# Patient Record
Sex: FEMALE | Race: WHITE | NOT HISPANIC OR LATINO | Employment: OTHER | URBAN - METROPOLITAN AREA
[De-identification: names, ages, dates, MRNs, and addresses within clinical notes are randomized per-mention and may not be internally consistent; named-entity substitution may affect disease eponyms.]

---

## 2016-08-16 LAB
EXTERNAL ABO GROUPING: NORMAL
EXTERNAL HEPATITIS B SURFACE ANTIGEN: NEGATIVE
EXTERNAL RH FACTOR: POSITIVE
EXTERNAL RUBELLA QUALITATIVE: NORMAL
EXTERNAL SYPHILIS RPR SCREEN: NORMAL
HIV1 AB SPEC QL IA.RAPID: NEGATIVE

## 2017-01-04 ENCOUNTER — ROUTINE PRENATAL (OUTPATIENT)
Dept: OBSTETRICS AND GYNECOLOGY | Facility: CLINIC | Age: 22
End: 2017-01-04

## 2017-01-04 ENCOUNTER — PROCEDURE VISIT (OUTPATIENT)
Dept: OBSTETRICS AND GYNECOLOGY | Facility: CLINIC | Age: 22
End: 2017-01-04

## 2017-01-04 VITALS — WEIGHT: 178.6 LBS | SYSTOLIC BLOOD PRESSURE: 122 MMHG | DIASTOLIC BLOOD PRESSURE: 68 MMHG

## 2017-01-04 DIAGNOSIS — Z34.03 ENCOUNTER FOR SUPERVISION OF NORMAL FIRST PREGNANCY IN THIRD TRIMESTER: Primary | ICD-10-CM

## 2017-01-04 DIAGNOSIS — R10.30 LOWER ABDOMINAL PAIN: ICD-10-CM

## 2017-01-04 LAB
BILIRUB BLD-MCNC: NEGATIVE MG/DL
GLUCOSE UR STRIP-MCNC: NEGATIVE MG/DL
KETONES UR QL: NEGATIVE
LEUKOCYTE EST, POC: NEGATIVE
NITRITE UR-MCNC: NEGATIVE MG/ML
PH UR: 6.5 [PH] (ref 5–8)
PROT UR STRIP-MCNC: NEGATIVE MG/DL
RBC # UR STRIP: ABNORMAL /UL
SP GR UR: 1.02 (ref 1–1.03)
UROBILINOGEN UR QL: NORMAL

## 2017-01-04 PROCEDURE — 99213 OFFICE O/P EST LOW 20 MIN: CPT | Performed by: OBSTETRICS & GYNECOLOGY

## 2017-01-04 PROCEDURE — 81002 URINALYSIS NONAUTO W/O SCOPE: CPT | Performed by: OBSTETRICS & GYNECOLOGY

## 2017-01-04 PROCEDURE — 76816 OB US FOLLOW-UP PER FETUS: CPT | Performed by: OBSTETRICS & GYNECOLOGY

## 2017-01-04 RX ORDER — ONDANSETRON HYDROCHLORIDE 8 MG/1
8 TABLET, FILM COATED ORAL EVERY 12 HOURS PRN
Qty: 24 TABLET | Refills: 2
Start: 2017-01-04 | End: 2017-02-21 | Stop reason: HOSPADM

## 2017-01-04 NOTE — MR AVS SNAPSHOT
Caroline Conley   1/4/2017 1:10 PM   Routine Prenatal    Dept Phone:  [phone]   Encounter #:  [id number]    Provider:  Axel Espinoza MD   Department:  [facility] OB [facility]YN                Your Full Care Plan              Today's Medication Changes          These changes are accurate as of: 1/4/17  1:57 PM.  If you have any questions, ask your nurse or doctor.               New Medication(s)Ordered:     ondansetron 8 MG tablet   Commonly known as:  ZOFRAN   Take 1 tablet by mouth Every 12 (Twelve) Hours As Needed for nausea or vomiting.   Started by:  Axel Espinoza MD            Where to Get Your Medications      Information about where to get these medications is not yet available     ! Ask your nurse or doctor about these medications     ondansetron 8 MG tablet                  Your Updated Medication List          This list is accurate as of: 1/4/17  1:57 PM.  Always use your most recent med list.                ondansetron 8 MG tablet   Commonly known as:  ZOFRAN   Take 1 tablet by mouth Every 12 (Twelve) Hours As Needed for nausea or vomiting.       * prenatal (CLASSIC) vitamin 28-0.8 MG tablet tablet       * prenatal vitamins 27-1 MG tablet tablet       promethazine 25 MG tablet   Commonly known as:  PHENERGAN       * Notice:  This list has 2 medication(s) that are the same as other medications prescribed for you. Read the directions carefully, and ask your doctor or other care provider to review them with you.            We Performed the Following     POC Urinalysis Dipstick       You Were Diagnosed With        Codes Comments    Encounter for supervision of normal first pregnancy in third trimester    -  Primary ICD-10-CM: Z34.03  ICD-9-CM: V22.0       Instructions     None    Patient Instructions History      Upcoming Appointments     Visit Type Date Time Department    OB FOLLOWUP 1/4/2017  1:10 PM [facility]    OB ULTRASOUND 1/4/2017  1:40 PM [facility]  [facility]    OB FOLLOWUP 1/18/2017 12:50 PM [facility]      MyChart Signup     Our records indicate that your [facility] [employer] account has been deactivated. If you would like to reactivate your account, please email [email or web address] or call [phone] to talk to our [employer] staff.             Other Info from Your Visit           Your Appointments     Jan 18, 2017 12:50 PM EST   OB FOLLOWUP with Axel Espinoza MD   [facility] (--)    [address] [place] [id number] [phone]              Allergies     Cefdinir  Anaphylaxis    Cephalosporins        Vital Signs     Blood Pressure Weight Last Menstrual Period Smoking Status          122/68 178 lb 9.6 oz (81 kg) 05/30/2016 Never Smoker        Problems and Diagnoses Noted     Prenatal care    -  Primary      Results     POC Urinalysis Dipstick      Component Value Standard Range & Units    Glucose, UA Negative Negative, 1000 mg/dL (3+) mg/dL    Bilirubin Negative Negative    Ketones, UA Negative Negative    Specific Gravity  1.025 1.005 - 1.030    Blood, UA Trace Negative    pH, Urine 6.5 5.0 - 8.0    Protein, POC Negative Negative mg/dL    Urobilinogen, UA Normal Normal    Leukocytes Negative Negative    Nitrite, UA Negative Negative

## 2017-01-04 NOTE — MR AVS SNAPSHOT
Caroline Conley   1/4/2017 1:40 PM   Procedure visit    Dept Phone:  [phone]   Encounter #:  [id number]    Provider:  [facility]OUND [facility]   Department:  [facility]YN                Your Full Care Plan              Today's Medication Changes          These changes are accurate as of: 1/4/17  1:56 PM.  If you have any questions, ask your nurse or doctor.               New Medication(s)Ordered:     ondansetron 8 MG tablet   Commonly known as:  ZOFRAN   Take 1 tablet by mouth Every 12 (Twelve) Hours As Needed for nausea or vomiting.   Started by:  Axel Espinoza MD            Where to Get Your Medications      Information about where to get these medications is not yet available     ! Ask your nurse or doctor about these medications     ondansetron 8 MG tablet                  Your Updated Medication List          This list is accurate as of: 1/4/17  1:56 PM.  Always use your most recent med list.                ondansetron 8 MG tablet   Commonly known as:  ZOFRAN   Take 1 tablet by mouth Every 12 (Twelve) Hours As Needed for nausea or vomiting.       * prenatal (CLASSIC) vitamin 28-0.8 MG tablet tablet       * prenatal vitamins 27-1 MG tablet tablet       promethazine 25 MG tablet   Commonly known as:  PHENERGAN       * Notice:  This list has 2 medication(s) that are the same as other medications prescribed for you. Read the directions carefully, and ask your doctor or other care provider to review them with you.            You Were Diagnosed With        Codes Comments    Large for dates    -  Primary ICD-10-CM: P08.1  ICD-9-CM: 766.1       Instructions     None    Patient Instructions History      Upcoming Appointments     Visit Type Date Time Department    OB FOLLOWUP 1/4/2017  1:10 PM [facility]    OB ULTRASOUND 1/4/2017  1:40 PM [facility]    OB FOLLOWUP 1/18/2017 12:50 PM [facility] [place] Signup     Our records indicate that  your [facility] [employer] account has been deactivated. If you would like to reactivate your account, please email [email or web address] or call [phone] to talk to our [employer] staff.             Other Info from Your Visit           Your Appointments     Jan 18, 2017 12:50 PM EST   OB FOLLOWUP with Axel Espinoza MD   [facility] (--)    [facility] [address] [address] [phone]              Allergies     Cefdinir  Anaphylaxis    Cephalosporins        Vital Signs     Last Menstrual Period Smoking Status                05/30/2016 Never Smoker          Problems and Diagnoses Noted     Large for gestational age    -  Primary

## 2017-01-04 NOTE — PROGRESS NOTES
1 hour glu wnl.  A+.  Having recent N&V.    Zoffran 8 mg, #24.  Keeping some fluids down and probably has a virus but has been instructed if not better after 12-24 hours and Zofran she needs to go to labor and delivery for IV fluids and labs to rule out such things as gallbladder disease.  Ultrasound today for size greater than dates.  Occasional cramping but cervix is long and closed.  We will hold TDAP till next visit.

## 2017-01-18 ENCOUNTER — PROCEDURE VISIT (OUTPATIENT)
Dept: OBSTETRICS AND GYNECOLOGY | Facility: CLINIC | Age: 22
End: 2017-01-18

## 2017-01-18 ENCOUNTER — ROUTINE PRENATAL (OUTPATIENT)
Dept: OBSTETRICS AND GYNECOLOGY | Facility: CLINIC | Age: 22
End: 2017-01-18

## 2017-01-18 VITALS — DIASTOLIC BLOOD PRESSURE: 72 MMHG | WEIGHT: 181.6 LBS | SYSTOLIC BLOOD PRESSURE: 126 MMHG

## 2017-01-18 DIAGNOSIS — J32.9 OTHER SINUSITIS: ICD-10-CM

## 2017-01-18 DIAGNOSIS — O36.8130 DECREASED FETAL MOVEMENT DETERMINED BY EXAMINATION, THIRD TRIMESTER, NOT APPLICABLE OR UNSPECIFIED FETUS: Primary | ICD-10-CM

## 2017-01-18 DIAGNOSIS — Z34.03 ENCOUNTER FOR SUPERVISION OF NORMAL FIRST PREGNANCY IN THIRD TRIMESTER: Primary | ICD-10-CM

## 2017-01-18 LAB
BILIRUB BLD-MCNC: NEGATIVE MG/DL
GLUCOSE UR STRIP-MCNC: NEGATIVE MG/DL
KETONES UR QL: NEGATIVE
LEUKOCYTE EST, POC: ABNORMAL
NITRITE UR-MCNC: NEGATIVE MG/ML
PH UR: 7 [PH] (ref 5–8)
PROT UR STRIP-MCNC: NEGATIVE MG/DL
RBC # UR STRIP: NEGATIVE /UL
SP GR UR: 1.03 (ref 1–1.03)
UROBILINOGEN UR QL: NORMAL

## 2017-01-18 PROCEDURE — 76819 FETAL BIOPHYS PROFIL W/O NST: CPT | Performed by: OBSTETRICS & GYNECOLOGY

## 2017-01-18 PROCEDURE — 99213 OFFICE O/P EST LOW 20 MIN: CPT | Performed by: OBSTETRICS & GYNECOLOGY

## 2017-01-18 PROCEDURE — 81002 URINALYSIS NONAUTO W/O SCOPE: CPT | Performed by: OBSTETRICS & GYNECOLOGY

## 2017-01-18 PROCEDURE — 76816 OB US FOLLOW-UP PER FETUS: CPT | Performed by: OBSTETRICS & GYNECOLOGY

## 2017-01-18 RX ORDER — AZITHROMYCIN 250 MG/1
TABLET, FILM COATED ORAL
Qty: 6 TABLET | Refills: 0
Start: 2017-01-18 | End: 2017-01-23

## 2017-01-18 NOTE — MR AVS SNAPSHOT
Caroline Conley   1/18/2017 12:50 PM   Routine Prenatal    Dept Phone:  [phone]   Encounter #:  [id number]    Provider:  Axel Espinoza MD   Department:  [facility]YN                Your Full Care Plan              Today's Medication Changes          These changes are accurate as of: 1/18/17  1:54 PM.  If you have any questions, ask your nurse or doctor.               New Medication(s)Ordered:     azithromycin 250 MG tablet   Commonly known as:  ZITHROMAX Z-CLARICE   Take 2 tablets (500 mg) on  Day 1,  followed by 1 tablet (250 mg) once daily on Days 2 through 5.   Started by:  Axel Espinoza MD            Where to Get Your Medications      Information about where to get these medications is not yet available     ! Ask your nurse or doctor about these medications     azithromycin 250 MG tablet                  Your Updated Medication List          This list is accurate as of: 1/18/17  1:54 PM.  Always use your most recent med list.                azithromycin 250 MG tablet   Commonly known as:  ZITHROMAX Z-CLARICE   Take 2 tablets (500 mg) on  Day 1,  followed by 1 tablet (250 mg) once daily on Days 2 through 5.       ondansetron 8 MG tablet   Commonly known as:  ZOFRAN   Take 1 tablet by mouth Every 12 (Twelve) Hours As Needed for nausea or vomiting.       * prenatal (CLASSIC) vitamin 28-0.8 MG tablet tablet       * prenatal vitamins 27-1 MG tablet tablet       promethazine 25 MG tablet   Commonly known as:  PHENERGAN       * Notice:  This list has 2 medication(s) that are the same as other medications prescribed for you. Read the directions carefully, and ask your doctor or other care provider to review them with you.            We Performed the Following     POC Urinalysis Dipstick       You Were Diagnosed With        Codes Comments    Encounter for supervision of normal first pregnancy in third trimester    -  Primary ICD-10-CM: Z34.03  ICD-9-CM: V22.0     Other  sinusitis     ICD-10-CM: J32.9       Instructions     None    Patient Instructions History      Upcoming Appointments     Visit Type Date Time Department    OB FOLLOWUP 1/18/2017 12:50 PM [facility]    OB ULTRASOUND 1/18/2017  1:40 PM [facility]    OB FOLLOWUP 2/1/2017  1:40 PM [facility]      MyChart Signup     Our records indicate that your [facility] [employer] account has been deactivated. If you would like to reactivate your account, please email [email or web address] or call [phone] to talk to our [employer] staff.             Other Info from Your Visit           Your Appointments     Feb 01, 2017  1:40 PM EST   OB FOLLOWUP with Axel Espinoza MD   [facility] (--)    [address] [place] [address] [phone]              Allergies     Cefdinir  Anaphylaxis    Cephalosporins        Vital Signs     Blood Pressure Weight Last Menstrual Period Smoking Status          126/72 181 lb 9.6 oz (82.4 kg) 05/30/2016 Never Smoker        Problems and Diagnoses Noted     Prenatal care    -  Primary    Other sinusitis          Results     POC Urinalysis Dipstick      Component Value Standard Range & Units    Glucose, UA Negative Negative, 1000 mg/dL (3+) mg/dL    Bilirubin Negative Negative    Ketones, UA Negative Negative    Specific Gravity  1.030 1.005 - 1.030    Blood, UA Negative Negative    pH, Urine 7.0 5.0 - 8.0    Protein, POC Negative Negative mg/dL    Urobilinogen, UA Normal Normal    Leukocytes Trace Negative    Nitrite, UA Negative Negative

## 2017-01-18 NOTE — MR AVS SNAPSHOT
Caroline Conley   1/18/2017 1:40 PM   Appointment    Dept Phone:  [phone]   Encounter #:  [id number]    Provider:  [facility]OUND [facility]   Department:  [facility]                Your Full Care Plan              Today's Medication Changes          These changes are accurate as of: 1/18/17  1:54 PM.  If you have any questions, ask your nurse or doctor.               New Medication(s)Ordered:     azithromycin 250 MG tablet   Commonly known as:  ZITHROMAX Z-CLARICE   Take 2 tablets (500 mg) on  Day 1,  followed by 1 tablet (250 mg) once daily on Days 2 through 5.   Started by:  Axel Espinoza MD            Where to Get Your Medications      Information about where to get these medications is not yet available     ! Ask your nurse or doctor about these medications     azithromycin 250 MG tablet                  Your Updated Medication List          This list is accurate as of: 1/18/17  1:54 PM.  Always use your most recent med list.                azithromycin 250 MG tablet   Commonly known as:  ZITHROMAX Z-CLARICE   Take 2 tablets (500 mg) on  Day 1,  followed by 1 tablet (250 mg) once daily on Days 2 through 5.       ondansetron 8 MG tablet   Commonly known as:  ZOFRAN   Take 1 tablet by mouth Every 12 (Twelve) Hours As Needed for nausea or vomiting.       * prenatal (CLASSIC) vitamin 28-0.8 MG tablet tablet       * prenatal vitamins 27-1 MG tablet tablet       promethazine 25 MG tablet   Commonly known as:  PHENERGAN       * Notice:  This list has 2 medication(s) that are the same as other medications prescribed for you. Read the directions carefully, and ask your doctor or other care provider to review them with you.            Instructions     None    Patient Instructions History      Upcoming Appointments     Visit Type Date Time Department    OB FOLLOWUP 1/18/2017 12:50 PM [facility]    OB ULTRASOUND 1/18/2017  1:40 PM [facility]    OB FOLLOWUP  2/1/2017  1:40 PM [facility]      [facility] Signup     Our records indicate that your [facility] [employer] account has been deactivated. If you would like to reactivate your account, please email [email or web address] or call [phone] to talk to our [employer] staff.             Other Info from Your Visit           Your Appointments     Feb 01, 2017  1:40 PM EST   OB FOLLOWUP with Axel Espinoza MD   [facility] (--)    [address] [place] [address] [phone]              Allergies     Cefdinir  Anaphylaxis    Cephalosporins        Vital Signs     Last Menstrual Period Smoking Status                05/30/2016 Never Smoker

## 2017-02-01 ENCOUNTER — ROUTINE PRENATAL (OUTPATIENT)
Dept: OBSTETRICS AND GYNECOLOGY | Facility: CLINIC | Age: 22
End: 2017-02-01

## 2017-02-01 VITALS — SYSTOLIC BLOOD PRESSURE: 116 MMHG | DIASTOLIC BLOOD PRESSURE: 66 MMHG | WEIGHT: 184 LBS

## 2017-02-01 DIAGNOSIS — Z34.03 ENCOUNTER FOR SUPERVISION OF NORMAL FIRST PREGNANCY IN THIRD TRIMESTER: Primary | ICD-10-CM

## 2017-02-01 LAB
BILIRUB BLD-MCNC: NEGATIVE MG/DL
EXTERNAL GROUP B STREP ANTIGEN: NEGATIVE
GLUCOSE UR STRIP-MCNC: NEGATIVE MG/DL
KETONES UR QL: NEGATIVE
LEUKOCYTE EST, POC: NEGATIVE
NITRITE UR-MCNC: NEGATIVE MG/ML
PROT UR STRIP-MCNC: NEGATIVE MG/DL
RBC # UR STRIP: NEGATIVE /UL

## 2017-02-01 PROCEDURE — 99212 OFFICE O/P EST SF 10 MIN: CPT | Performed by: OBSTETRICS & GYNECOLOGY

## 2017-02-01 PROCEDURE — 90715 TDAP VACCINE 7 YRS/> IM: CPT | Performed by: OBSTETRICS & GYNECOLOGY

## 2017-02-01 PROCEDURE — 81002 URINALYSIS NONAUTO W/O SCOPE: CPT | Performed by: OBSTETRICS & GYNECOLOGY

## 2017-02-01 PROCEDURE — 90471 IMMUNIZATION ADMIN: CPT | Performed by: OBSTETRICS & GYNECOLOGY

## 2017-02-01 RX ORDER — VITAMIN A ACETATE, BETA CAROTENE, ASCORBIC ACID, CHOLECALCIFEROL, .ALPHA.-TOCOPHEROL ACETATE, DL-, THIAMINE MONONITRATE, RIBOFLAVIN, NIACINAMIDE, PYRIDOXINE HYDROCHLORIDE, FOLIC ACID, CYANOCOBALAMIN, CALCIUM CARBONATE, FERROUS FUMARATE, ZINC OXIDE, CUPRIC OXIDE 3080; 12; 120; 400; 1; 1.84; 3; 20; 22; 920; 25; 200; 27; 10; 2 [IU]/1; UG/1; MG/1; [IU]/1; MG/1; MG/1; MG/1; MG/1; MG/1; [IU]/1; MG/1; MG/1; MG/1; MG/1; MG/1
1 TABLET, FILM COATED ORAL
COMMUNITY
End: 2017-02-21 | Stop reason: HOSPADM

## 2017-02-01 RX ORDER — PROMETHAZINE HYDROCHLORIDE 25 MG/1
25 TABLET ORAL EVERY 6 HOURS
COMMUNITY
End: 2017-02-21 | Stop reason: HOSPADM

## 2017-02-01 NOTE — PROGRESS NOTES
BPP last visit 8/8  VTX  4-13  47%  RAFFI=15 cm   .  CC  Prenatal f/u visit.  Had upper respiratory infection but feeling better.  Patient now having good fetal movement.  Assessment   anterior pregnancy at 35 weeks 2 days gestation doing well.  Good fetal growth.  Good fetal movement.  Plan    patient will receive TDAP.  GBS done.  Return to office in 1 week.    Patient tolerated TDAP Vaccine well.

## 2017-02-03 LAB
GP B STREP DNA SPEC QL NAA+PROBE: NEGATIVE
ONE SPECIMEN IDENTIFIER: NORMAL

## 2017-02-08 ENCOUNTER — ROUTINE PRENATAL (OUTPATIENT)
Dept: OBSTETRICS AND GYNECOLOGY | Facility: CLINIC | Age: 22
End: 2017-02-08

## 2017-02-08 VITALS — DIASTOLIC BLOOD PRESSURE: 72 MMHG | WEIGHT: 188.4 LBS | SYSTOLIC BLOOD PRESSURE: 110 MMHG

## 2017-02-08 DIAGNOSIS — Z34.03 ENCOUNTER FOR SUPERVISION OF NORMAL FIRST PREGNANCY IN THIRD TRIMESTER: Primary | ICD-10-CM

## 2017-02-08 LAB
BILIRUB BLD-MCNC: NEGATIVE MG/DL
GLUCOSE UR STRIP-MCNC: NEGATIVE MG/DL
KETONES UR QL: NEGATIVE
LEUKOCYTE EST, POC: ABNORMAL
NITRITE UR-MCNC: NEGATIVE MG/ML
PROT UR STRIP-MCNC: NEGATIVE MG/DL
RBC # UR STRIP: NEGATIVE /UL
UROBILINOGEN UR QL: NORMAL

## 2017-02-08 PROCEDURE — 99212 OFFICE O/P EST SF 10 MIN: CPT | Performed by: OBSTETRICS & GYNECOLOGY

## 2017-02-08 PROCEDURE — 81002 URINALYSIS NONAUTO W/O SCOPE: CPT | Performed by: OBSTETRICS & GYNECOLOGY

## 2017-02-15 ENCOUNTER — PROCEDURE VISIT (OUTPATIENT)
Dept: OBSTETRICS AND GYNECOLOGY | Facility: CLINIC | Age: 22
End: 2017-02-15

## 2017-02-15 ENCOUNTER — ROUTINE PRENATAL (OUTPATIENT)
Dept: OBSTETRICS AND GYNECOLOGY | Facility: CLINIC | Age: 22
End: 2017-02-15

## 2017-02-15 VITALS — WEIGHT: 188 LBS | SYSTOLIC BLOOD PRESSURE: 118 MMHG | DIASTOLIC BLOOD PRESSURE: 70 MMHG

## 2017-02-15 DIAGNOSIS — Z36.89 ENCOUNTER FOR ULTRASOUND TO CHECK FETAL GROWTH: Primary | ICD-10-CM

## 2017-02-15 DIAGNOSIS — Z34.03 ENCOUNTER FOR SUPERVISION OF NORMAL FIRST PREGNANCY IN THIRD TRIMESTER: Primary | ICD-10-CM

## 2017-02-15 LAB
BILIRUB BLD-MCNC: NEGATIVE MG/DL
GLUCOSE UR STRIP-MCNC: NEGATIVE MG/DL
KETONES UR QL: NEGATIVE
LEUKOCYTE EST, POC: ABNORMAL
NITRITE UR-MCNC: NEGATIVE MG/ML
PH UR: 7 [PH] (ref 5–8)
PROT UR STRIP-MCNC: NEGATIVE MG/DL
RBC # UR STRIP: ABNORMAL /UL
SP GR UR: 1.02 (ref 1–1.03)
UROBILINOGEN UR QL: NORMAL

## 2017-02-15 PROCEDURE — 81002 URINALYSIS NONAUTO W/O SCOPE: CPT | Performed by: NURSE PRACTITIONER

## 2017-02-15 PROCEDURE — 76805 OB US >/= 14 WKS SNGL FETUS: CPT | Performed by: NURSE PRACTITIONER

## 2017-02-15 PROCEDURE — 99213 OFFICE O/P EST LOW 20 MIN: CPT | Performed by: NURSE PRACTITIONER

## 2017-02-21 ENCOUNTER — HOSPITAL ENCOUNTER (OUTPATIENT)
Facility: HOSPITAL | Age: 22
Setting detail: OBSERVATION
Discharge: HOME | End: 2017-02-21
Attending: OBSTETRICS & GYNECOLOGY | Admitting: OBSTETRICS & GYNECOLOGY

## 2017-02-21 VITALS
DIASTOLIC BLOOD PRESSURE: 70 MMHG | HEIGHT: 62 IN | OXYGEN SATURATION: 99 % | WEIGHT: 190 LBS | TEMPERATURE: 97.9 F | RESPIRATION RATE: 18 BRPM | HEART RATE: 88 BPM | SYSTOLIC BLOOD PRESSURE: 105 MMHG | BODY MASS INDEX: 34.96 KG/M2

## 2017-02-21 PROBLEM — B00.9 HERPES: Status: ACTIVE | Noted: 2017-02-21

## 2017-02-21 PROBLEM — Z34.90 PREGNANCY: Status: ACTIVE | Noted: 2017-02-21

## 2017-02-21 PROCEDURE — G0378 HOSPITAL OBSERVATION PER HR: HCPCS

## 2017-02-21 PROCEDURE — 59025 FETAL NON-STRESS TEST: CPT

## 2017-02-21 RX ORDER — VALACYCLOVIR HYDROCHLORIDE 500 MG/1
500 TABLET, FILM COATED ORAL 2 TIMES DAILY
Qty: 6 TABLET | Refills: 0 | Status: SHIPPED | OUTPATIENT
Start: 2017-02-21 | End: 2017-02-24

## 2017-02-22 ENCOUNTER — ROUTINE PRENATAL (OUTPATIENT)
Dept: OBSTETRICS AND GYNECOLOGY | Facility: CLINIC | Age: 22
End: 2017-02-22

## 2017-02-22 VITALS — DIASTOLIC BLOOD PRESSURE: 74 MMHG | SYSTOLIC BLOOD PRESSURE: 126 MMHG | BODY MASS INDEX: 34.82 KG/M2 | WEIGHT: 190.4 LBS

## 2017-02-22 DIAGNOSIS — Z34.03 ENCOUNTER FOR SUPERVISION OF NORMAL FIRST PREGNANCY IN THIRD TRIMESTER: Primary | ICD-10-CM

## 2017-02-22 LAB
BILIRUB BLD-MCNC: NEGATIVE MG/DL
GLUCOSE UR STRIP-MCNC: NEGATIVE MG/DL
KETONES UR QL: NEGATIVE
LEUKOCYTE EST, POC: ABNORMAL
NITRITE UR-MCNC: NEGATIVE MG/ML
PH UR: 7 [PH] (ref 5–8)
PROT UR STRIP-MCNC: NEGATIVE MG/DL
RBC # UR STRIP: ABNORMAL /UL
SP GR UR: 1020 (ref 1–1.03)
UROBILINOGEN UR QL: NORMAL

## 2017-02-22 PROCEDURE — 81002 URINALYSIS NONAUTO W/O SCOPE: CPT | Performed by: OBSTETRICS & GYNECOLOGY

## 2017-02-22 PROCEDURE — 99212 OFFICE O/P EST SF 10 MIN: CPT | Performed by: OBSTETRICS & GYNECOLOGY

## 2017-02-22 NOTE — PROGRESS NOTES
CC prenatal follow up visit.  Complains of herpes outbreak.  Patient seen in labor and delivery last evening and noted to have mild outbreak of herpes and is starting Valtrex 1 g a day today.  She realizes a  is needed if she is not totally resolved by the time she goes into labor.  Her cervix was checked last evening and only 1 cm and posterior.  Impression #1 intrauterine pregnancy 38 weeks 2 days, HSV-2 outbreak  Plan return to office in 1 week and continue Valtrex 1 g a day remainder of the pregnancy.

## 2017-02-22 NOTE — NURSING NOTE
Phone report called to Dr. Chaidez. Informed of 20yo  here at 38.1 weeks with c/o constant left upper and left lower abdominal pain since around 0100. She also reports that around that time she felt a gush of clear fluid. She denies any continued leaking through the night or through the day today. nitrazine is negative, cervix is very posterior 0.5/40-50/-2. FHR is reactive and category 1 and only 1 ctx noted. Abdomen is soft and slightly tender to palpation to the LUQ and LLQ. Orders received to observe and if FHR continues to be category 1, D/C home. Next appt scheduled for tomorrow. Liliana Loo RN

## 2017-02-22 NOTE — DISCHARGE SUMMARY
Date of Discharge:  2017    Discharge Diagnosis: 1.  Intrauterine pregnancy at 38 and one sevenths weeks, undelivered.  2.  Outbreak of genital herpes    Presenting Problem/History of Present Illness  Pregnancy [Z33.1]       Hospital Course  Patient is a 21 y.o. female presented at 38 and one sevenths weeks.  She reported a gush of clear fluid after taking a bath yesterday.  No fluid since then.  She presented to rule out labor and rule out rupture of membranes.  On examination, the cervix was less than 1 cm dilated.  Heart tones were category 1 and they were very few contractions.  Nitrazine was negative.  On examination by the patient's nurse, a herpetic lesion was found.  I counseled the patient regarding this.  She reports that she has been diagnosed with herpes for several years.  She stopped taking her herpes medicine during her pregnancy.  She experiences only rare outbreaks.  We discussed the importance of herpes suppression.  We also discussed the possibility that  delivery would be required if a herpetic lesion were found at the time of labor.  The patient will treat with Valtrex 500 mg twice daily.  She will follow up with Dr. Espinoza tomorrow.  After she completes her treatment, she will begin suppressive therapy of Valtrex 1000 mg daily for the remainder of her pregnancy. .      Procedures Performed         Consults:   Consults     No orders found from 2017 to 2017.            Condition on Discharge:  Stable    Vital Signs  Temp:  [97.9 °F (36.6 °C)] 97.9 °F (36.6 °C)  Heart Rate:  [96-98] 98  Resp:  [18] 18  BP: (107-138)/(70-79) 110/71    Physical Exam:   See above    Discharge Disposition  Home or Self Care    Discharge Medications   Caroline Conley   Home Medication Instructions ARRON:[id number]    Printed on:17   Medication Information                      prenatal vitamins (PRENATAL 27-1) 27-1 MG tablet tablet  Take 1 tablet by mouth.             valACYclovir  (VALTREX) 500 MG tablet  Take 1 tablet by mouth 2 (Two) Times a Day for 3 days.                 Discharge Diet:     Activity at Discharge:     Follow-up Appointments  Future Appointments  Date Time Provider Department Center   2/22/2017 9:40 AM Axel Espinoza MD [facility] None         Test Results Pending at Discharge       Beni Chaidez MD  02/21/17  9:11 PM    Time: Discharge 10 min

## 2017-03-01 ENCOUNTER — PROCEDURE VISIT (OUTPATIENT)
Dept: OBSTETRICS AND GYNECOLOGY | Facility: CLINIC | Age: 22
End: 2017-03-01

## 2017-03-01 ENCOUNTER — ROUTINE PRENATAL (OUTPATIENT)
Dept: OBSTETRICS AND GYNECOLOGY | Facility: CLINIC | Age: 22
End: 2017-03-01

## 2017-03-01 VITALS — WEIGHT: 189.2 LBS | DIASTOLIC BLOOD PRESSURE: 74 MMHG | SYSTOLIC BLOOD PRESSURE: 128 MMHG | BODY MASS INDEX: 34.61 KG/M2

## 2017-03-01 DIAGNOSIS — O26.893 PELVIC PAIN IN PREGNANCY, ANTEPARTUM, THIRD TRIMESTER: Primary | ICD-10-CM

## 2017-03-01 DIAGNOSIS — Z34.03 ENCOUNTER FOR SUPERVISION OF NORMAL FIRST PREGNANCY IN THIRD TRIMESTER: Primary | ICD-10-CM

## 2017-03-01 DIAGNOSIS — O26.899 PELVIC PAIN AFFECTING PREGNANCY: ICD-10-CM

## 2017-03-01 DIAGNOSIS — R10.2 PELVIC PAIN IN PREGNANCY, ANTEPARTUM, THIRD TRIMESTER: Primary | ICD-10-CM

## 2017-03-01 DIAGNOSIS — R10.2 PELVIC PAIN AFFECTING PREGNANCY: ICD-10-CM

## 2017-03-01 LAB
BILIRUB BLD-MCNC: NEGATIVE MG/DL
GLUCOSE UR STRIP-MCNC: NEGATIVE MG/DL
KETONES UR QL: NEGATIVE
LEUKOCYTE EST, POC: ABNORMAL
NITRITE UR-MCNC: NEGATIVE MG/ML
PH UR: 6 [PH] (ref 5–8)
PROT UR STRIP-MCNC: NEGATIVE MG/DL
RBC # UR STRIP: ABNORMAL /UL
SP GR UR: 1.02 (ref 1–1.03)
UROBILINOGEN UR QL: NORMAL

## 2017-03-01 PROCEDURE — 81002 URINALYSIS NONAUTO W/O SCOPE: CPT | Performed by: OBSTETRICS & GYNECOLOGY

## 2017-03-01 PROCEDURE — 76816 OB US FOLLOW-UP PER FETUS: CPT | Performed by: OBSTETRICS & GYNECOLOGY

## 2017-03-01 PROCEDURE — 76819 FETAL BIOPHYS PROFIL W/O NST: CPT | Performed by: OBSTETRICS & GYNECOLOGY

## 2017-03-01 PROCEDURE — 99213 OFFICE O/P EST LOW 20 MIN: CPT | Performed by: OBSTETRICS & GYNECOLOGY

## 2017-03-01 NOTE — PROGRESS NOTES
Cc prenatal f/u visit.  Patient was seen in ER last evening with GI virus and some contractions.  She is feeling better today and her herpes outbreak has totally resolved.  She is continuing her Valtrex daily.  We'll obtain a BPP ultrasound today due to her having occasional pelvic pain.  Assessment.  39 weeks 2 days with recent HSV all break now resolved, and mild gastroenteritis with mild abdominal pain.  Plan biophysical today and return to office in 1 week with BPP then.  Consider induction at 41 weeks if undelivered as has passport  Insurance and cannot induce prior to then without problems

## 2017-03-02 ENCOUNTER — ANESTHESIA (OUTPATIENT)
Dept: LABOR AND DELIVERY | Facility: HOSPITAL | Age: 22
End: 2017-03-02

## 2017-03-02 ENCOUNTER — ANESTHESIA EVENT (OUTPATIENT)
Dept: LABOR AND DELIVERY | Facility: HOSPITAL | Age: 22
End: 2017-03-02

## 2017-03-02 ENCOUNTER — HOSPITAL ENCOUNTER (INPATIENT)
Facility: HOSPITAL | Age: 22
LOS: 4 days | Discharge: HOME | End: 2017-03-06
Attending: OBSTETRICS & GYNECOLOGY | Admitting: OBSTETRICS & GYNECOLOGY

## 2017-03-02 DIAGNOSIS — Z37.9 NORMAL LABOR: Primary | ICD-10-CM

## 2017-03-02 PROBLEM — Z34.90 PREGNANT: Status: ACTIVE | Noted: 2017-03-02

## 2017-03-02 LAB
ABO GROUP BLD: NORMAL
BASOPHILS # BLD AUTO: 0.01 10*3/MM3 (ref 0–0.2)
BASOPHILS NFR BLD AUTO: 0.1 % (ref 0–1.5)
BLD GP AB SCN SERPL QL: NEGATIVE
DEPRECATED RDW RBC AUTO: 52.4 FL (ref 37–54)
EOSINOPHIL # BLD AUTO: 0.06 10*3/MM3 (ref 0–0.7)
EOSINOPHIL NFR BLD AUTO: 0.6 % (ref 0.3–6.2)
ERYTHROCYTE [DISTWIDTH] IN BLOOD BY AUTOMATED COUNT: 15.8 % (ref 11.7–13)
HCT VFR BLD AUTO: 36.6 % (ref 35.6–45.5)
HGB BLD-MCNC: 12.2 G/DL (ref 11.9–15.5)
IMM GRANULOCYTES # BLD: 0.03 10*3/MM3 (ref 0–0.03)
IMM GRANULOCYTES NFR BLD: 0.3 % (ref 0–0.5)
LYMPHOCYTES # BLD AUTO: 2.22 10*3/MM3 (ref 0.9–4.8)
LYMPHOCYTES NFR BLD AUTO: 22.1 % (ref 19.6–45.3)
MCH RBC QN AUTO: 30.1 PG (ref 26.9–32)
MCHC RBC AUTO-ENTMCNC: 33.3 G/DL (ref 32.4–36.3)
MCV RBC AUTO: 90.4 FL (ref 80.5–98.2)
MONOCYTES # BLD AUTO: 0.88 10*3/MM3 (ref 0.2–1.2)
MONOCYTES NFR BLD AUTO: 8.7 % (ref 5–12)
NEUTROPHILS # BLD AUTO: 6.86 10*3/MM3 (ref 1.9–8.1)
NEUTROPHILS NFR BLD AUTO: 68.2 % (ref 42.7–76)
PLATELET # BLD AUTO: 265 10*3/MM3 (ref 140–500)
PMV BLD AUTO: 10.1 FL (ref 6–12)
RBC # BLD AUTO: 4.05 10*6/MM3 (ref 3.9–5.2)
RH BLD: POSITIVE
WBC NRBC COR # BLD: 10.06 10*3/MM3 (ref 4.5–10.7)

## 2017-03-02 PROCEDURE — 86901 BLOOD TYPING SEROLOGIC RH(D): CPT

## 2017-03-02 PROCEDURE — 86900 BLOOD TYPING SEROLOGIC ABO: CPT

## 2017-03-02 PROCEDURE — 25010000002 HYDROMORPHONE PER 4 MG: Performed by: ANESTHESIOLOGY

## 2017-03-02 PROCEDURE — 25010000002 MORPHINE PER 10 MG

## 2017-03-02 PROCEDURE — 59514 CESAREAN DELIVERY ONLY: CPT | Performed by: OBSTETRICS & GYNECOLOGY

## 2017-03-02 PROCEDURE — 10907ZC DRAINAGE OF AMNIOTIC FLUID, THERAPEUTIC FROM PRODUCTS OF CONCEPTION, VIA NATURAL OR ARTIFICIAL OPENING: ICD-10-PCS | Performed by: OBSTETRICS & GYNECOLOGY

## 2017-03-02 PROCEDURE — 25010000002 ONDANSETRON PER 1 MG: Performed by: ANESTHESIOLOGY

## 2017-03-02 PROCEDURE — S0260 H&P FOR SURGERY: HCPCS | Performed by: OBSTETRICS & GYNECOLOGY

## 2017-03-02 PROCEDURE — 25010000002 FENTANYL CITRATE (PF) 100 MCG/2ML SOLUTION

## 2017-03-02 PROCEDURE — C1755 CATHETER, INTRASPINAL: HCPCS

## 2017-03-02 PROCEDURE — 85025 COMPLETE CBC W/AUTO DIFF WBC: CPT | Performed by: OBSTETRICS & GYNECOLOGY

## 2017-03-02 PROCEDURE — 86850 RBC ANTIBODY SCREEN: CPT

## 2017-03-02 PROCEDURE — C1755 CATHETER, INTRASPINAL: HCPCS | Performed by: ANESTHESIOLOGY

## 2017-03-02 RX ORDER — PROMETHAZINE HYDROCHLORIDE 25 MG/ML
12.5 INJECTION, SOLUTION INTRAMUSCULAR; INTRAVENOUS EVERY 6 HOURS PRN
Status: DISCONTINUED | OUTPATIENT
Start: 2017-03-02 | End: 2017-03-02 | Stop reason: HOSPADM

## 2017-03-02 RX ORDER — CLINDAMYCIN PHOSPHATE 900 MG/50ML
900 INJECTION INTRAVENOUS EVERY 8 HOURS
Status: COMPLETED | OUTPATIENT
Start: 2017-03-03 | End: 2017-03-03

## 2017-03-02 RX ORDER — VALACYCLOVIR HYDROCHLORIDE 500 MG/1
500 TABLET, FILM COATED ORAL 2 TIMES DAILY
COMMUNITY

## 2017-03-02 RX ORDER — ONDANSETRON 4 MG/1
4 TABLET, ORALLY DISINTEGRATING ORAL EVERY 6 HOURS PRN
Status: DISCONTINUED | OUTPATIENT
Start: 2017-03-02 | End: 2017-03-02 | Stop reason: HOSPADM

## 2017-03-02 RX ORDER — SODIUM CHLORIDE, SODIUM LACTATE, POTASSIUM CHLORIDE, CALCIUM CHLORIDE 600; 310; 30; 20 MG/100ML; MG/100ML; MG/100ML; MG/100ML
125 INJECTION, SOLUTION INTRAVENOUS CONTINUOUS
Status: DISCONTINUED | OUTPATIENT
Start: 2017-03-02 | End: 2017-03-03

## 2017-03-02 RX ORDER — MORPHINE SULFATE 1 MG/ML
INJECTION, SOLUTION EPIDURAL; INTRATHECAL; INTRAVENOUS
Status: COMPLETED
Start: 2017-03-02 | End: 2017-03-02

## 2017-03-02 RX ORDER — SODIUM CHLORIDE 0.9 % (FLUSH) 0.9 %
1-10 SYRINGE (ML) INJECTION AS NEEDED
Status: DISCONTINUED | OUTPATIENT
Start: 2017-03-02 | End: 2017-03-02 | Stop reason: HOSPADM

## 2017-03-02 RX ORDER — OXYTOCIN/RINGER'S LACTATE 10/500ML
999 PLASTIC BAG, INJECTION (ML) INTRAVENOUS ONCE
Status: COMPLETED | OUTPATIENT
Start: 2017-03-02 | End: 2017-03-02

## 2017-03-02 RX ORDER — DIPHENHYDRAMINE HYDROCHLORIDE 50 MG/ML
12.5 INJECTION INTRAMUSCULAR; INTRAVENOUS EVERY 8 HOURS PRN
Status: DISCONTINUED | OUTPATIENT
Start: 2017-03-02 | End: 2017-03-02 | Stop reason: HOSPADM

## 2017-03-02 RX ORDER — METHYLERGONOVINE MALEATE 0.2 MG/ML
200 INJECTION INTRAVENOUS ONCE AS NEEDED
Status: DISCONTINUED | OUTPATIENT
Start: 2017-03-02 | End: 2017-03-02 | Stop reason: HOSPADM

## 2017-03-02 RX ORDER — PROMETHAZINE HYDROCHLORIDE 12.5 MG/1
12.5 SUPPOSITORY RECTAL EVERY 6 HOURS PRN
Status: DISCONTINUED | OUTPATIENT
Start: 2017-03-02 | End: 2017-03-02 | Stop reason: HOSPADM

## 2017-03-02 RX ORDER — ONDANSETRON 2 MG/ML
4 INJECTION INTRAMUSCULAR; INTRAVENOUS EVERY 6 HOURS PRN
Status: DISCONTINUED | OUTPATIENT
Start: 2017-03-02 | End: 2017-03-02 | Stop reason: HOSPADM

## 2017-03-02 RX ORDER — SODIUM CHLORIDE 9 MG/ML
100 INJECTION, SOLUTION INTRAVENOUS CONTINUOUS
Status: DISCONTINUED | OUTPATIENT
Start: 2017-03-02 | End: 2017-03-03

## 2017-03-02 RX ORDER — BUTORPHANOL TARTRATE 1 MG/ML
1 INJECTION, SOLUTION INTRAMUSCULAR; INTRAVENOUS
Status: DISCONTINUED | OUTPATIENT
Start: 2017-03-02 | End: 2017-03-02 | Stop reason: HOSPADM

## 2017-03-02 RX ORDER — OXYTOCIN/RINGER'S LACTATE 10/500ML
999 PLASTIC BAG, INJECTION (ML) INTRAVENOUS ONCE
Status: DISCONTINUED | OUTPATIENT
Start: 2017-03-02 | End: 2017-03-02 | Stop reason: HOSPADM

## 2017-03-02 RX ORDER — OXYTOCIN/RINGER'S LACTATE 10/500ML
125 PLASTIC BAG, INJECTION (ML) INTRAVENOUS CONTINUOUS PRN
Status: DISCONTINUED | OUTPATIENT
Start: 2017-03-02 | End: 2017-03-02 | Stop reason: HOSPADM

## 2017-03-02 RX ORDER — FAMOTIDINE 10 MG/ML
20 INJECTION, SOLUTION INTRAVENOUS ONCE AS NEEDED
Status: DISCONTINUED | OUTPATIENT
Start: 2017-03-02 | End: 2017-03-02 | Stop reason: HOSPADM

## 2017-03-02 RX ORDER — CARBOPROST TROMETHAMINE 250 UG/ML
250 INJECTION, SOLUTION INTRAMUSCULAR AS NEEDED
Status: DISCONTINUED | OUTPATIENT
Start: 2017-03-02 | End: 2017-03-02 | Stop reason: HOSPADM

## 2017-03-02 RX ORDER — FENTANYL CITRATE 50 UG/ML
INJECTION, SOLUTION INTRAMUSCULAR; INTRAVENOUS
Status: COMPLETED
Start: 2017-03-02 | End: 2017-03-02

## 2017-03-02 RX ORDER — PROMETHAZINE HYDROCHLORIDE 25 MG/1
12.5 TABLET ORAL EVERY 6 HOURS PRN
Status: DISCONTINUED | OUTPATIENT
Start: 2017-03-02 | End: 2017-03-02 | Stop reason: HOSPADM

## 2017-03-02 RX ORDER — ACETAMINOPHEN 500 MG
1000 TABLET ORAL EVERY 6 HOURS PRN
COMMUNITY
End: 2017-03-06 | Stop reason: HOSPADM

## 2017-03-02 RX ORDER — MISOPROSTOL 200 UG/1
800 TABLET ORAL AS NEEDED
Status: DISCONTINUED | OUTPATIENT
Start: 2017-03-02 | End: 2017-03-02 | Stop reason: HOSPADM

## 2017-03-02 RX ORDER — LIDOCAINE HYDROCHLORIDE 20 MG/ML
INJECTION, SOLUTION INFILTRATION; PERINEURAL AS NEEDED
Status: DISCONTINUED | OUTPATIENT
Start: 2017-03-02 | End: 2017-03-02 | Stop reason: SURG

## 2017-03-02 RX ORDER — LIDOCAINE HYDROCHLORIDE AND EPINEPHRINE 10; 10 MG/ML; UG/ML
INJECTION, SOLUTION INFILTRATION; PERINEURAL AS NEEDED
Status: DISCONTINUED | OUTPATIENT
Start: 2017-03-02 | End: 2017-03-02 | Stop reason: SURG

## 2017-03-02 RX ORDER — CLINDAMYCIN PHOSPHATE 900 MG/50ML
900 INJECTION INTRAVENOUS ONCE
Status: COMPLETED | OUTPATIENT
Start: 2017-03-02 | End: 2017-03-02

## 2017-03-02 RX ORDER — HYDROMORPHONE HYDROCHLORIDE 1 MG/ML
0.25 INJECTION, SOLUTION INTRAMUSCULAR; INTRAVENOUS; SUBCUTANEOUS
Status: DISCONTINUED | OUTPATIENT
Start: 2017-03-02 | End: 2017-03-02 | Stop reason: HOSPADM

## 2017-03-02 RX ORDER — TERBUTALINE SULFATE 1 MG/ML
0.25 INJECTION, SOLUTION SUBCUTANEOUS AS NEEDED
Status: DISCONTINUED | OUTPATIENT
Start: 2017-03-02 | End: 2017-03-02 | Stop reason: HOSPADM

## 2017-03-02 RX ORDER — ONDANSETRON 4 MG/1
4 TABLET, FILM COATED ORAL EVERY 6 HOURS PRN
Status: DISCONTINUED | OUTPATIENT
Start: 2017-03-02 | End: 2017-03-02 | Stop reason: HOSPADM

## 2017-03-02 RX ORDER — ONDANSETRON 2 MG/ML
4 INJECTION INTRAMUSCULAR; INTRAVENOUS ONCE AS NEEDED
Status: COMPLETED | OUTPATIENT
Start: 2017-03-02 | End: 2017-03-02

## 2017-03-02 RX ORDER — MIDAZOLAM HYDROCHLORIDE 1 MG/ML
INJECTION INTRAMUSCULAR; INTRAVENOUS
Status: DISCONTINUED
Start: 2017-03-02 | End: 2017-03-02 | Stop reason: WASHOUT

## 2017-03-02 RX ORDER — PROMETHAZINE HYDROCHLORIDE 25 MG/1
25 TABLET ORAL EVERY 6 HOURS PRN
COMMUNITY
End: 2017-03-06 | Stop reason: HOSPADM

## 2017-03-02 RX ADMIN — LIDOCAINE HYDROCHLORIDE 5 ML: 15 INJECTION, SOLUTION EPIDURAL; INFILTRATION; INTRACAUDAL; PERINEURAL at 20:35

## 2017-03-02 RX ADMIN — SODIUM CHLORIDE, POTASSIUM CHLORIDE, SODIUM LACTATE AND CALCIUM CHLORIDE 1000 ML: 600; 310; 30; 20 INJECTION, SOLUTION INTRAVENOUS at 08:29

## 2017-03-02 RX ADMIN — LIDOCAINE HYDROCHLORIDE 5 ML: 20 INJECTION, SOLUTION INFILTRATION; PERINEURAL at 21:12

## 2017-03-02 RX ADMIN — LIDOCAINE HYDROCHLORIDE 5 ML: 15 INJECTION, SOLUTION EPIDURAL; INFILTRATION; INTRACAUDAL; PERINEURAL at 20:44

## 2017-03-02 RX ADMIN — SODIUM CHLORIDE, POTASSIUM CHLORIDE, SODIUM LACTATE AND CALCIUM CHLORIDE 125 ML/HR: 600; 310; 30; 20 INJECTION, SOLUTION INTRAVENOUS at 13:45

## 2017-03-02 RX ADMIN — CLINDAMYCIN PHOSPHATE 900 MG: 18 INJECTION, SOLUTION INTRAMUSCULAR; INTRAVENOUS at 20:23

## 2017-03-02 RX ADMIN — HYDROMORPHONE HYDROCHLORIDE 0.25 MG: 1 INJECTION, SOLUTION INTRAMUSCULAR; INTRAVENOUS; SUBCUTANEOUS at 22:19

## 2017-03-02 RX ADMIN — EPHEDRINE SULFATE 5 MG: 50 INJECTION INTRAMUSCULAR; INTRAVENOUS; SUBCUTANEOUS at 12:43

## 2017-03-02 RX ADMIN — HYDROMORPHONE HYDROCHLORIDE 0.25 MG: 1 INJECTION, SOLUTION INTRAMUSCULAR; INTRAVENOUS; SUBCUTANEOUS at 22:35

## 2017-03-02 RX ADMIN — SODIUM CHLORIDE, POTASSIUM CHLORIDE, SODIUM LACTATE AND CALCIUM CHLORIDE: 600; 310; 30; 20 INJECTION, SOLUTION INTRAVENOUS at 21:40

## 2017-03-02 RX ADMIN — Medication 125 ML/HR: at 22:35

## 2017-03-02 RX ADMIN — Medication 10 ML/HR: at 09:36

## 2017-03-02 RX ADMIN — LIDOCAINE HYDROCHLORIDE 5 ML: 15 INJECTION, SOLUTION EPIDURAL; INFILTRATION; INTRACAUDAL; PERINEURAL at 20:18

## 2017-03-02 RX ADMIN — SODIUM CHLORIDE, POTASSIUM CHLORIDE, SODIUM LACTATE AND CALCIUM CHLORIDE 999 ML/HR: 600; 310; 30; 20 INJECTION, SOLUTION INTRAVENOUS at 09:35

## 2017-03-02 RX ADMIN — ONDANSETRON 4 MG: 2 INJECTION INTRAMUSCULAR; INTRAVENOUS at 21:54

## 2017-03-02 RX ADMIN — OXYTOCIN 999 ML/HR: 10 INJECTION, SOLUTION INTRAMUSCULAR; INTRAVENOUS at 21:08

## 2017-03-02 RX ADMIN — EPHEDRINE SULFATE 5 MG: 50 INJECTION INTRAMUSCULAR; INTRAVENOUS; SUBCUTANEOUS at 12:54

## 2017-03-02 RX ADMIN — LIDOCAINE HYDROCHLORIDE,EPINEPHRINE BITARTRATE 5 ML: 10; .01 INJECTION, SOLUTION INFILTRATION; PERINEURAL at 09:33

## 2017-03-02 RX ADMIN — EPHEDRINE SULFATE 5 MG: 50 INJECTION INTRAMUSCULAR; INTRAVENOUS; SUBCUTANEOUS at 12:34

## 2017-03-02 RX ADMIN — FENTANYL CITRATE 100 MCG: 50 INJECTION INTRAMUSCULAR; INTRAVENOUS at 21:11

## 2017-03-02 RX ADMIN — SODIUM CHLORIDE, POTASSIUM CHLORIDE, SODIUM LACTATE AND CALCIUM CHLORIDE 999 ML/HR: 600; 310; 30; 20 INJECTION, SOLUTION INTRAVENOUS at 12:33

## 2017-03-02 RX ADMIN — LIDOCAINE HYDROCHLORIDE 5 ML: 15 INJECTION, SOLUTION EPIDURAL; INFILTRATION; INTRACAUDAL; PERINEURAL at 20:51

## 2017-03-02 RX ADMIN — HYDROMORPHONE HYDROCHLORIDE 0.25 MG: 1 INJECTION, SOLUTION INTRAMUSCULAR; INTRAVENOUS; SUBCUTANEOUS at 23:28

## 2017-03-02 RX ADMIN — MORPHINE SULFATE 3 MG: 1 INJECTION EPIDURAL; INTRATHECAL; INTRAVENOUS at 21:11

## 2017-03-02 RX ADMIN — EPHEDRINE SULFATE 25 MG: 50 INJECTION INTRAMUSCULAR; INTRAVENOUS; SUBCUTANEOUS at 13:00

## 2017-03-02 NOTE — H&P
[facility]  Obstetric History and Physical    Chief Complaint   Patient presents with   • Contractions     pt rpt contractions starting earllier today andhave increased to 6-7min curently. +FM, denies LOF or VB. pt seen at [facility] at 0200 and discharged home.        Subjective     Patient is a 21 y.o. female  currently at 39w3d, who presents with contractions.    Her prenatal care is complicated by  sexually transmitted disease  genital herpes  no current active lesion or prodromal symptoms are preseent.  Her previous obstetric/gynecological history is noted for is non-contributory.    The following portions of the patients history were reviewed and updated as appropriate: current medications, allergies, past medical history, past surgical history, past family history, past social history and problem list .       Prenatal Information:  Prenatal Results         1st Trimester Ref. Range Date Time   CBC with auto diff       Rubella IgG ^ Immune   16    Hepatitis B SAg  Negative  Negative 16 1435   RPR  Non Reactive  Non Reactive 16 1435   ABO  A   17 0830   Rh  Positive   17 0830   Anibody Screen  Negative   17 0830   HIV ^ Negative   16    Varicella IgG       Urinalysis with microscopy       Urine Culture       GC/Chlamydia/TV       ThinPrep/Pap       2nd and 3rd Trimester Ref. Range Date Time   Hemoglobin / Hematocrit  36.6 % 35.6 - 45.5 % 17 0830   Hemoglobin  12.2 g/dL 11.9 - 15.5 g/dL 17 0830   Group B Strep Culture ^ Negative   17    Glucose Challege Test 1 hour       Glucose Tolerance Test 3 hours       Pre-eclampsia Panel       Risk Screening Ref. Range Date Time   Fetal Fibronectin       Amnisure       Hepatitis C Antibody       Hemoglobin electrophoresis       Cystic Fibrosis       Hemoglobin A1C       MSAFP - 4       NIPT       AFP  26.0 ng/mL ng/mL 16 1208   Parvovirus IgG       Parvovirus IgM       POCT - glucose        [facility]       24 Hour urine - Total protein       24 Hour urine - Creatinine clearance       Urinalysis with microscopy       Urine Culture       Drug Screening Ref. Range Date Time   Amphetamine Screen       Barbiturate Screen       Benzodiazepine Screen       Methadone Screen       Phencyclidine Screen       Opiates Screen       THC Screen       Cocaine Screen       Amphetamine Screen       Propoxyphene Screen       Buprenorphine Screen       Methamphetamine Screen       Oxycodone Screen       Tryicyclic Antidepressants Screen              Legend: ^: Historical            View all results for this pregnancy        External Prenatal Results         Pregnancy Outside Results - these were transcribed from office records.  See scanned records for details. Date Time   Hgb      Hct      ABO ^ A  16    Rh ^ Positive  16    Antibody Screen      Glucose Fasting GTT      Glucose Tolerance Test 1 hour      Glucose Tolerance Test 3 hour      Gonorrhea (discrete)      Chlamydia (discrete)      RPR ^ Non-Reactive  16    VDRL      Syphillis Antibody      Rubella ^ Immune  16    HBsAg ^ Negative  16    Herpes Simplex Virus PCR      Herpes Simplex VIrus Culture      HIV ^ Negative  16    Hep C RNA Quant PCR      Hep C Antibody      Urine Drug Screen      AFP      Group B Strep ^ Negative  17    GBS Susceptibility to Clindamycin      GBS Susceptibility to Eythromycin      Fetal Fibronectin      Genetic Testing, Maternal Blood             Legend: ^: Historical           Past OB History:     Obstetric History       T0      TAB0   SAB0   E0   M0   L0       # Outcome Date GA Lbr Marco Antonio/2nd Weight Sex Delivery Anes PTL Lv   1 Current                   Past Medical History: Past Medical History   Diagnosis Date   • Herpes      last outbreak , no current outbrek, FOB does not know   • Hyperemesis gravidarum    • Migraine       Past Surgical History Past Surgical History    Procedure Laterality Date   • [place] tooth extraction     • Tonsillectomy     • Adenoidectomy        Family History: History reviewed. No pertinent family history.   Social History:  reports that she has never smoked. She has never used smokeless tobacco.   reports that she drinks alcohol.   reports that she does not use illicit drugs.        Review of Systems      Objective     Vital Signs Range for the last 24 hours  Temperature: Temp:  [97.6 °F (36.4 °C)] 97.6 °F (36.4 °C)   Temp Source: Temp src: Oral   BP: BP: ()/(46-89) 102/56   Pulse: Heart Rate:  [72-94] 85   Respirations: Resp:  [20] 20   SPO2: SpO2:  [100 %] 100 %   O2 Amount (l/min):     O2 Devices O2 Device: room air   Weight: Weight:  [189 lb 3.2 oz (85.8 kg)-195 lb 3.2 oz (88.5 kg)] 195 lb 3.2 oz (88.5 kg)     Physical Examination: General appearance - alert, well appearing, and in no distress  Mental status - alert, oriented to person, place, and time  Neck - supple, no significant adenopathy  Chest - clear to auscultation, no wheezes, rales or rhonchi, symmetric air entry  Heart - normal rate, regular rhythm, normal S1, S2, no murmurs, rubs, clicks or gallops  Abdomen - soft, nontender, nondistended, no masses or organomegaly  Neurological - alert, oriented, normal speech, no focal findings or movement disorder noted  Extremities - peripheral pulses normal, no pedal edema, no clubbing or cyanosis    Presentation: vtx   Cervix: Exam by: Method: sterile exam per physician   Dilation: Dilation: 4   Effacement: Cervical Effacement: 90%   Station: Station: -2     Fetal Heart Rate Assessment   Method: Fetal HR Assessment Method: external   Beats/min: Fetal HR (Beats/Min): 130   Baseline: Fetal HR Baseline: normal range (110-160 bpm)   Varibility: Fetal HR Variability: moderate (amplitude range 6 to 25 bpm)   Accels: Fetal HR Accelerations: greater than/equal to 15 bpm, lasting at least 15 seconds   Decels: Fetal HR Decelerations: absent   Tracing  Category: Fetal HR Tracing Category: Category I     Uterine Assessment   Method: Method: per patient report   Frequency (min): Contraction Frequency (min): 2-7   Ctx Count in 10 min: Contraction Frequency in 10min: 2   Duration: Contraction Duration (sec):    Intensity: Contraction Intensity: moderate by palpation   Intensity by IUPC:     Resting Tone: Uterine Resting Tone: soft by palpation, relaxation >60 sec   Resting Tone by IUPC:     [place] Units:       Laboratory Results: reviewed  Radiology Review: EFW 7-10 yesterday  Other Studies: n/a    Assessment/Plan     Principal Problem:    Normal labor  Active Problems:    Pregnancy    Pregnant      Assessment & Plan    Assessment:  1.  Intrauterine pregnancy at 39w3d weeks gestation with reactive fetal status.    2.  labor  without ROM  3.  Obstetrical history significant for resolved HSV lesion.  4.  GBS status: No results found for: GBSANTIGEN    Plan:  1. fetal and uterine monitoring  continuously, expectant management and analgesia with  epidural  2. Plan of care has been reviewed with patient and family  3.  Risks, benefits of treatment plan have been discussed.  4.  All questions have been answered.        Panda Skelton MD  3/2/2017  10:07 AM

## 2017-03-02 NOTE — ANESTHESIA PROCEDURE NOTES
Labor Epidural    Patient location during procedure: OB  Start Time: 3/2/2017 9:25 AM  Stop Time: 3/2/2017 9:29 AM  Performed By  Anesthesiologist: BORIS JARA  Preanesthetic Checklist  Completed: patient identified, site marked, surgical consent, pre-op evaluation, timeout performed, IV checked, risks and benefits discussed and monitors and equipment checked  Additional Notes  Labor epidural using Arrow kit, no heme/CSF aspirated, no paraesthesias.  Test dose with 3cc 1.5% lido with epi is negative.    Epidural Block Prep:  Pt Position:sitting  Sterile Tech:cap, gloves, mask and sterile barrier  Monitoring:blood pressure monitoring, continuous pulse oximetry and EKG  Epidural Block Procedure:  Approach:midline  Guidance:landmark technique and palpation technique  Location:L3-L4  Needle Type:Tuohy  Needle Gauge:17  Loss of Resistance: 5cm  Cath Depth at skin:10 cm  Paresthesia: none  Aspiration:negative  Test Dose:negative  Post Assessment:  Dressing:occlusive dressing applied and secured with tape  Pt Tolerance:patient tolerated the procedure well with no apparent complications  Complications:no

## 2017-03-02 NOTE — ANESTHESIA PREPROCEDURE EVALUATION
Anesthesia Evaluation     Patient summary reviewed and Nursing notes reviewed   no history of anesthetic complications:  NPO Status: > 8 hours   Airway   Mallampati: II  TM distance: >3 FB  Neck ROM: full  no difficulty expected  Dental - normal exam     Pulmonary - negative pulmonary ROS and normal exam   Cardiovascular - negative cardio ROS and normal exam  Exercise tolerance: good (4-7 METS)    Rhythm: regular  Rate: normal        Neuro/Psych  (+) headaches,    GI/Hepatic/Renal/Endo - negative ROS     Musculoskeletal (-) negative ROS    Abdominal  - normal exam   Substance History - negative use     OB/GYN    (+) Pregnant,         Other - negative ROS                                   Anesthesia Plan    ASA 2     epidural     Anesthetic plan and risks discussed with patient.    Plan discussed with attending.

## 2017-03-03 LAB
BASOPHILS # BLD AUTO: 0.02 10*3/MM3 (ref 0–0.2)
BASOPHILS NFR BLD AUTO: 0.1 % (ref 0–1.5)
DEPRECATED RDW RBC AUTO: 53.4 FL (ref 37–54)
EOSINOPHIL # BLD AUTO: 0.02 10*3/MM3 (ref 0–0.7)
EOSINOPHIL NFR BLD AUTO: 0.1 % (ref 0.3–6.2)
ERYTHROCYTE [DISTWIDTH] IN BLOOD BY AUTOMATED COUNT: 16.2 % (ref 11.7–13)
HCT VFR BLD AUTO: 28.7 % (ref 35.6–45.5)
HGB BLD-MCNC: 9.5 G/DL (ref 11.9–15.5)
IMM GRANULOCYTES # BLD: 0.05 10*3/MM3 (ref 0–0.03)
IMM GRANULOCYTES NFR BLD: 0.3 % (ref 0–0.5)
LYMPHOCYTES # BLD AUTO: 1.39 10*3/MM3 (ref 0.9–4.8)
LYMPHOCYTES NFR BLD AUTO: 9 % (ref 19.6–45.3)
MCH RBC QN AUTO: 29.8 PG (ref 26.9–32)
MCHC RBC AUTO-ENTMCNC: 33.1 G/DL (ref 32.4–36.3)
MCV RBC AUTO: 90 FL (ref 80.5–98.2)
MONOCYTES # BLD AUTO: 1.28 10*3/MM3 (ref 0.2–1.2)
MONOCYTES NFR BLD AUTO: 8.3 % (ref 5–12)
NEUTROPHILS # BLD AUTO: 12.67 10*3/MM3 (ref 1.9–8.1)
NEUTROPHILS NFR BLD AUTO: 82.2 % (ref 42.7–76)
PLATELET # BLD AUTO: 210 10*3/MM3 (ref 140–500)
PMV BLD AUTO: 9.9 FL (ref 6–12)
RBC # BLD AUTO: 3.19 10*6/MM3 (ref 3.9–5.2)
WBC NRBC COR # BLD: 15.43 10*3/MM3 (ref 4.5–10.7)

## 2017-03-03 PROCEDURE — 85025 COMPLETE CBC W/AUTO DIFF WBC: CPT | Performed by: OBSTETRICS & GYNECOLOGY

## 2017-03-03 PROCEDURE — 63710000001 DIPHENHYDRAMINE PER 50 MG: Performed by: ANESTHESIOLOGY

## 2017-03-03 PROCEDURE — 99231 SBSQ HOSP IP/OBS SF/LOW 25: CPT | Performed by: OBSTETRICS & GYNECOLOGY

## 2017-03-03 RX ORDER — DIPHENHYDRAMINE HYDROCHLORIDE 50 MG/ML
25 INJECTION INTRAMUSCULAR; INTRAVENOUS EVERY 4 HOURS PRN
Status: DISCONTINUED | OUTPATIENT
Start: 2017-03-03 | End: 2017-03-06 | Stop reason: HOSPADM

## 2017-03-03 RX ORDER — SIMETHICONE 80 MG
80 TABLET,CHEWABLE ORAL 4 TIMES DAILY PRN
Status: DISCONTINUED | OUTPATIENT
Start: 2017-03-03 | End: 2017-03-06 | Stop reason: HOSPADM

## 2017-03-03 RX ORDER — NALOXONE HCL 0.4 MG/ML
0.2 VIAL (ML) INJECTION
Status: DISCONTINUED | OUTPATIENT
Start: 2017-03-03 | End: 2017-03-06 | Stop reason: HOSPADM

## 2017-03-03 RX ORDER — PROMETHAZINE HYDROCHLORIDE 25 MG/ML
12.5 INJECTION, SOLUTION INTRAMUSCULAR; INTRAVENOUS EVERY 6 HOURS PRN
Status: DISCONTINUED | OUTPATIENT
Start: 2017-03-03 | End: 2017-03-06 | Stop reason: HOSPADM

## 2017-03-03 RX ORDER — CALCIUM CARBONATE 200(500)MG
2 TABLET,CHEWABLE ORAL EVERY 6 HOURS PRN
Status: DISCONTINUED | OUTPATIENT
Start: 2017-03-03 | End: 2017-03-06 | Stop reason: HOSPADM

## 2017-03-03 RX ORDER — IBUPROFEN 800 MG/1
800 TABLET ORAL EVERY 8 HOURS PRN
Status: DISCONTINUED | OUTPATIENT
Start: 2017-03-03 | End: 2017-03-06 | Stop reason: HOSPADM

## 2017-03-03 RX ORDER — DOCUSATE SODIUM 100 MG/1
100 CAPSULE, LIQUID FILLED ORAL 2 TIMES DAILY PRN
Status: DISCONTINUED | OUTPATIENT
Start: 2017-03-03 | End: 2017-03-06 | Stop reason: HOSPADM

## 2017-03-03 RX ORDER — OXYTOCIN/RINGER'S LACTATE 10/500ML
125 PLASTIC BAG, INJECTION (ML) INTRAVENOUS CONTINUOUS PRN
Status: ACTIVE | OUTPATIENT
Start: 2017-03-03 | End: 2017-03-04

## 2017-03-03 RX ORDER — OXYTOCIN/RINGER'S LACTATE 10/500ML
999 PLASTIC BAG, INJECTION (ML) INTRAVENOUS ONCE
Status: DISCONTINUED | OUTPATIENT
Start: 2017-03-03 | End: 2017-03-06 | Stop reason: HOSPADM

## 2017-03-03 RX ORDER — OXYCODONE HYDROCHLORIDE AND ACETAMINOPHEN 5; 325 MG/1; MG/1
2 TABLET ORAL EVERY 4 HOURS PRN
Status: DISCONTINUED | OUTPATIENT
Start: 2017-03-03 | End: 2017-03-06 | Stop reason: HOSPADM

## 2017-03-03 RX ORDER — OXYCODONE HYDROCHLORIDE AND ACETAMINOPHEN 5; 325 MG/1; MG/1
1 TABLET ORAL EVERY 4 HOURS PRN
Status: DISCONTINUED | OUTPATIENT
Start: 2017-03-03 | End: 2017-03-06 | Stop reason: HOSPADM

## 2017-03-03 RX ORDER — BISACODYL 10 MG
10 SUPPOSITORY, RECTAL RECTAL DAILY PRN
Status: DISCONTINUED | OUTPATIENT
Start: 2017-03-03 | End: 2017-03-06 | Stop reason: HOSPADM

## 2017-03-03 RX ORDER — DIPHENHYDRAMINE HCL 25 MG
25 CAPSULE ORAL EVERY 4 HOURS PRN
Status: DISCONTINUED | OUTPATIENT
Start: 2017-03-03 | End: 2017-03-06 | Stop reason: HOSPADM

## 2017-03-03 RX ORDER — MISOPROSTOL 200 UG/1
600 TABLET ORAL ONCE
Status: DISCONTINUED | OUTPATIENT
Start: 2017-03-03 | End: 2017-03-06 | Stop reason: HOSPADM

## 2017-03-03 RX ORDER — ONDANSETRON 2 MG/ML
4 INJECTION INTRAMUSCULAR; INTRAVENOUS ONCE AS NEEDED
Status: DISCONTINUED | OUTPATIENT
Start: 2017-03-03 | End: 2017-03-06 | Stop reason: HOSPADM

## 2017-03-03 RX ORDER — PRENATAL VIT NO.126/IRON/FOLIC 28MG-0.8MG
1 TABLET ORAL DAILY
Status: DISCONTINUED | OUTPATIENT
Start: 2017-03-03 | End: 2017-03-06 | Stop reason: HOSPADM

## 2017-03-03 RX ORDER — VALACYCLOVIR HYDROCHLORIDE 500 MG/1
500 TABLET, FILM COATED ORAL 2 TIMES DAILY
Status: DISCONTINUED | OUTPATIENT
Start: 2017-03-03 | End: 2017-03-06 | Stop reason: HOSPADM

## 2017-03-03 RX ORDER — ONDANSETRON 2 MG/ML
4 INJECTION INTRAMUSCULAR; INTRAVENOUS EVERY 6 HOURS PRN
Status: DISCONTINUED | OUTPATIENT
Start: 2017-03-03 | End: 2017-03-06 | Stop reason: HOSPADM

## 2017-03-03 RX ORDER — MORPHINE SULFATE 2 MG/ML
2 INJECTION, SOLUTION INTRAMUSCULAR; INTRAVENOUS
Status: ACTIVE | OUTPATIENT
Start: 2017-03-03 | End: 2017-03-04

## 2017-03-03 RX ADMIN — VALACYCLOVIR 500 MG: 500 TABLET, FILM COATED ORAL at 20:59

## 2017-03-03 RX ADMIN — DOCUSATE SODIUM 100 MG: 100 CAPSULE, LIQUID FILLED ORAL at 09:34

## 2017-03-03 RX ADMIN — CLINDAMYCIN PHOSPHATE 900 MG: 18 INJECTION, SOLUTION INTRAMUSCULAR; INTRAVENOUS at 12:00

## 2017-03-03 RX ADMIN — OXYCODONE HYDROCHLORIDE AND ACETAMINOPHEN 1 TABLET: 5; 325 TABLET ORAL at 18:15

## 2017-03-03 RX ADMIN — SIMETHICONE CHEW TAB 80 MG 80 MG: 80 TABLET ORAL at 09:34

## 2017-03-03 RX ADMIN — CLINDAMYCIN PHOSPHATE 900 MG: 18 INJECTION, SOLUTION INTRAMUSCULAR; INTRAVENOUS at 03:48

## 2017-03-03 RX ADMIN — VALACYCLOVIR 500 MG: 500 TABLET, FILM COATED ORAL at 09:59

## 2017-03-03 RX ADMIN — SIMETHICONE CHEW TAB 80 MG 80 MG: 80 TABLET ORAL at 18:15

## 2017-03-03 RX ADMIN — OXYCODONE HYDROCHLORIDE AND ACETAMINOPHEN 1 TABLET: 5; 325 TABLET ORAL at 01:58

## 2017-03-03 RX ADMIN — IBUPROFEN 800 MG: 800 TABLET ORAL at 10:54

## 2017-03-03 RX ADMIN — OXYCODONE HYDROCHLORIDE AND ACETAMINOPHEN 1 TABLET: 5; 325 TABLET ORAL at 10:54

## 2017-03-03 RX ADMIN — IBUPROFEN 800 MG: 800 TABLET ORAL at 01:58

## 2017-03-03 RX ADMIN — Medication 1 TABLET: at 09:34

## 2017-03-03 RX ADMIN — OXYCODONE HYDROCHLORIDE AND ACETAMINOPHEN 1 TABLET: 5; 325 TABLET ORAL at 06:37

## 2017-03-03 RX ADMIN — IBUPROFEN 800 MG: 800 TABLET ORAL at 18:14

## 2017-03-03 RX ADMIN — DOCUSATE SODIUM 100 MG: 100 CAPSULE, LIQUID FILLED ORAL at 18:15

## 2017-03-03 RX ADMIN — DIPHENHYDRAMINE HYDROCHLORIDE 25 MG: 25 CAPSULE ORAL at 09:34

## 2017-03-03 NOTE — PROGRESS NOTES
[facility]   PROGRESS NOTE    Post-Op Day 1 S/P   Subjective   Subjective  Patient reports:  Pain is well controlled with prescription NSAID's including ibuprofen (Motrin) and narcotic analgesics including oxycodone/acetaminophen (Percocet, Tylox).  She is  ambulating. Tolerating diet. Tolerating po -- normal.  Intake -- c/o of tolerating po solids and tolerating po liquids.   Voiding - rubio in; no flatus reported..  Vaginal bleeding is as much as expected.    Objective    Objective     Vitals: Vital Signs Range for the last 24 hours  Temperature: Temp:  [97.1 °F (36.2 °C)-98.9 °F (37.2 °C)] 97.4 °F (36.3 °C)   Temp Source: Temp src: Oral   BP: BP: (0-134)/(0-89) 100/54   Pulse: Heart Rate:  [] 102   Respirations: Resp:  [16-18] 18   SPO2: SpO2:  [94 %-100 %] 94 %   O2 Amount (l/min): Flow (L/min):  [10] 10   O2 Devices O2 Device: room air   Weight:              Physical Exam    Lungs clear to auscultation bilaterally   Abdomen Soft, non-tender, normal bowel sounds; no bruits, organomegaly or masses.   Incision  Bandage clean, dry.   Extremities extremities normal, atraumatic, no cyanosis or edema     I reviewed the patient's new clinical results.    Assessment/Plan      Principal Problem:    Normal labor  Active Problems:    Pregnancy    Pregnant    Arrest of dilation, delivered, current hospitalization    Assessment & Plan    Assessment:    Caroline Conley is Day 1  post-partum  , Low Transverse    .      Plan:  remove dressing, remove urine catheter, advance diet  normal diet as tolerated and continue post op care.      Panda Skelton MD  17  8:15 AM

## 2017-03-03 NOTE — L&D DELIVERY NOTE
Arrest of dilation and descent  Primary LTCS  Epidural  Costa/Ray  VMI 7-11.3 8/9  No complications  EBL 800cc  Bottle feeding

## 2017-03-03 NOTE — OP NOTE
DATE OF PROCEDURE:  2017    PREOPERATIVE DIAGNOSES:  1.  Term intrauterine pregnancy.  2.  Active phase arrest of dilatation and descent.     POSTOPERATIVE DIAGNOSES:   1.  Term intrauterine pregnancy.  2.  Active phase arrest of dilatation and descent.   3.  Occiput posterior presentation.     PROCEDURE: Primary low transverse  section.     SURGEON: Panda Skelton MD     ASSISTANT: TORO Hammond      ANESTHESIA: Epidural.     COMPLICATIONS: None.     ESTIMATED BLOOD LOSS: 800 mL     DRAINS: [place] to bedside.     INDICATIONS: This is a 21-year-old white female primigravida at 39-1/2 weeks who presented with spontaneous onset of labor. She progressed along a normal labor curve to approximately 7-8 cm dilatation. At that time, she developed cervical edema and was found to have occiput posterior presentation. Attempts at position change and amnioinfusion along with adequate contractions failed to rotate the vertex or make any significant cervical change or cause any further descent of the vertex. Options were discussed with the patient and her family, and decision for  section was made.     DESCRIPTION OF PROCEDURE: With a functioning epidural, the patient was prepped and draped in the usual sterile fashion in left lateral recumbent position. Pfannenstiel incision was made and carried down through layers of the peritoneum. Bleeders were clamped and coagulated as they were encountered. The peritoneum was opened superiorly, extended the length of the incision, and the bladder blade placed. Visceral peritoneum incised and a bladder flap created with sharp dissection and bladder blade re-placed. Low-transverse uterine incision was made and extended laterally, bluntly. The infant was delivered as a vertex from the direct occiput posterior position. This was a viable male, 7 pounds, 11.3 ounces with Apgar scores of 8 at one minute and 9 at five minutes. Cord was doubly clamped and cut, and  the baby given to attending  personnel. Cord blood was then obtained. The placenta was manually removed from the fundal region of the uterus. The uterus was exteriorized and wiped clean. Uterus, tubes, and ovaries appeared grossly normal. The uterine incision was intact and repaired with a running interlocking stitch of 0 chromic. Hemostasis was achieved with interrupted figure-of-eight stitches of 0 chromic. The visceral peritoneum was reapproximated with a running stitch of 3-0 Vicryl. The uterus was placed back in the abdomen. The gutters wiped clean of blood and debris. Parietal peritoneum was then closed with a running stitch of 3-0 Vicryl. Subfascial and subcutaneous tissues were hemostatic. Fascia was closed with a running stitch of 0 Vicryl. Skin was closed with staples. The incision was bandaged and dressed. Sponge, needle and instrument counts were correct x3. The patient and  tolerated the procedure well and were taken to the recovery room in stable condition.         LONDON Martins:chidi  D:   2017 21:48:23  T:   2017 23:21:14  Job ID:   [id number]  Document ID:   [id number]  cc:

## 2017-03-03 NOTE — PROGRESS NOTES
Pt has remained 8-9cm without descent in spite of adequate contractions.  OP position. Disc options.  Rec C/S due to arrest of dilation and descent.  Pt and family agree to this mode of delivery.  Risks, complications and convalescence disc in detail.

## 2017-03-03 NOTE — PLAN OF CARE
Problem: Patient Care Overview (Adult)  Goal: Plan of Care Review  Outcome: Ongoing (interventions implemented as appropriate)    03/02/17 2318   Coping/Psychosocial Response Interventions   Plan Of Care Reviewed With patient;spouse;family   Patient Care Overview   Progress improving       Goal: Adult Individualization and Mutuality  Outcome: Ongoing (interventions implemented as appropriate)  Goal: Discharge Needs Assessment  Outcome: Ongoing (interventions implemented as appropriate)    03/02/17 2318   Discharge Needs Assessment   Concerns To Be Addressed no discharge needs identified         Problem: Labor (Cervical Ripen, Induct, Augment) (Adult,Obstetrics,Pediatric)  Goal: Signs and Symptoms of Listed Potential Problems Will be Absent or Manageable (Labor)  Outcome: Outcome(s) achieved Date Met:  03/02/17 03/02/17 2318   Labor (Cervical Ripen, Induct, Augment)   Problems Assessed (Labor) all   Problems Present (Labor) pain;fetal well-being change;malpresentation;other (see comments)  (failure to progress)         03/02/17 2318   Labor (Cervical Ripen, Induct, Augment)   Problems Assessed (Labor) all   Problems Present (Labor) pain;fetal well-being change;malpresentation;other (see comments)  (failure to progress)

## 2017-03-03 NOTE — NURSING NOTE
Arrived to [facility] via bed w/anesthesia and RN at bedside.  SCD's pump reactivated, monitors placed, hand-off report rec'd from anesthesia.

## 2017-03-04 PROCEDURE — 99232 SBSQ HOSP IP/OBS MODERATE 35: CPT | Performed by: OBSTETRICS & GYNECOLOGY

## 2017-03-04 RX ADMIN — VALACYCLOVIR 500 MG: 500 TABLET, FILM COATED ORAL at 14:44

## 2017-03-04 RX ADMIN — Medication 1 TABLET: at 09:23

## 2017-03-04 RX ADMIN — IBUPROFEN 800 MG: 800 TABLET ORAL at 18:53

## 2017-03-04 RX ADMIN — OXYCODONE HYDROCHLORIDE AND ACETAMINOPHEN 1 TABLET: 5; 325 TABLET ORAL at 18:53

## 2017-03-04 RX ADMIN — OXYCODONE HYDROCHLORIDE AND ACETAMINOPHEN 2 TABLET: 5; 325 TABLET ORAL at 02:28

## 2017-03-04 RX ADMIN — SIMETHICONE CHEW TAB 80 MG 80 MG: 80 TABLET ORAL at 09:23

## 2017-03-04 RX ADMIN — SIMETHICONE CHEW TAB 80 MG 80 MG: 80 TABLET ORAL at 17:53

## 2017-03-04 RX ADMIN — VALACYCLOVIR 500 MG: 500 TABLET, FILM COATED ORAL at 22:01

## 2017-03-04 RX ADMIN — SIMETHICONE CHEW TAB 80 MG 80 MG: 80 TABLET ORAL at 02:28

## 2017-03-04 RX ADMIN — IBUPROFEN 800 MG: 800 TABLET ORAL at 02:28

## 2017-03-04 RX ADMIN — DOCUSATE SODIUM 100 MG: 100 CAPSULE, LIQUID FILLED ORAL at 09:23

## 2017-03-04 RX ADMIN — OXYCODONE HYDROCHLORIDE AND ACETAMINOPHEN 2 TABLET: 5; 325 TABLET ORAL at 07:50

## 2017-03-04 RX ADMIN — IBUPROFEN 800 MG: 800 TABLET ORAL at 11:18

## 2017-03-04 RX ADMIN — DOCUSATE SODIUM 100 MG: 100 CAPSULE, LIQUID FILLED ORAL at 17:53

## 2017-03-04 NOTE — PROGRESS NOTES
Section Progress Note    Assessment/Plan     Status post  section: Doing well postoperatively.     1) S/P , doing well.  2) Postpartum anemia - Hg 9.5 gms - appears to be surgical loss as was 12.2 pre op.  Asymptomatic - starto po iron     Rh status: A positive  Rubella: immune  Gender: Male s/p circ    Subjective     Postpartum Day 2:  Delivery    The patient feels well. The patient denies emotional concerns. Pain is well controlled with current medications. The baby is well.Urinary output is adequate. The patient is ambulating well. The patient is tolerating a normal diet. Patient reports flatus.    Objective     Vital signs in last 24 hours:  Temp:  [97.6 °F (36.4 °C)-98.6 °F (37 °C)] 97.9 °F (36.6 °C)  Heart Rate:  [] 90  Resp:  [18-20] 18  BP: ()/(63-68) 103/65      General:    alert, appears stated age and cooperative   Bowel Sounds:  active   Lochia:  appropriate   Uterine Fundus:   firm   Incision:  healing well, no significant drainage, no dehiscence, no significant erythema   DVT Evaluation:  No evidence of DVT seen on physical exam.     Lab Results   Component Value Date    WBC 15.43 (H) 2017    HGB 9.5 (L) 2017    HCT 28.7 (L) 2017    MCV 90.0 2017     2017         Tyrone Chowdary MD  3/4/2017  11:36 AM

## 2017-03-04 NOTE — PLAN OF CARE
Problem: Patient Care Overview (Adult)  Goal: Plan of Care Review  Outcome: Ongoing (interventions implemented as appropriate)    17 0419   Coping/Psychosocial Response Interventions   Plan Of Care Reviewed With patient   Patient Care Overview   Progress improving   Outcome Evaluation   Outcome Summary/Follow up Plan ambulating and voiding x's 2, pain well controlled w percocet       Goal: Adult Individualization and Mutuality  Outcome: Ongoing (interventions implemented as appropriate)  Goal: Discharge Needs Assessment  Outcome: Ongoing (interventions implemented as appropriate)    Problem: Postpartum ( Delivery) (Adult)  Goal: Signs and Symptoms of Listed Potential Problems Will be Absent or Manageable (Postpartum)  Outcome: Ongoing (interventions implemented as appropriate)

## 2017-03-04 NOTE — PLAN OF CARE
Problem: Patient Care Overview (Adult)  Goal: Plan of Care Review  Outcome: Ongoing (interventions implemented as appropriate)  Goal: Adult Individualization and Mutuality  Outcome: Ongoing (interventions implemented as appropriate)  Plans discharge on day four, doing well on po pain meds. Incision with staples clean and dry  Goal: Discharge Needs Assessment  Outcome: Ongoing (interventions implemented as appropriate)    Problem: Postpartum ( Delivery) (Adult)  Goal: Signs and Symptoms of Listed Potential Problems Will be Absent or Manageable (Postpartum)  Outcome: Ongoing (interventions implemented as appropriate)

## 2017-03-05 PROCEDURE — 99232 SBSQ HOSP IP/OBS MODERATE 35: CPT | Performed by: OBSTETRICS & GYNECOLOGY

## 2017-03-05 RX ORDER — FERROUS SULFATE 325(65) MG
325 TABLET ORAL
Status: DISCONTINUED | OUTPATIENT
Start: 2017-03-05 | End: 2017-03-06 | Stop reason: HOSPADM

## 2017-03-05 RX ADMIN — OXYCODONE HYDROCHLORIDE AND ACETAMINOPHEN 1 TABLET: 5; 325 TABLET ORAL at 16:38

## 2017-03-05 RX ADMIN — IBUPROFEN 800 MG: 800 TABLET ORAL at 09:52

## 2017-03-05 RX ADMIN — VALACYCLOVIR 500 MG: 500 TABLET, FILM COATED ORAL at 09:52

## 2017-03-05 RX ADMIN — SIMETHICONE CHEW TAB 80 MG 80 MG: 80 TABLET ORAL at 09:52

## 2017-03-05 RX ADMIN — Medication 1 TABLET: at 09:00

## 2017-03-05 RX ADMIN — FERROUS SULFATE TAB 325 MG (65 MG ELEMENTAL FE) 325 MG: 325 (65 FE) TAB at 12:32

## 2017-03-05 RX ADMIN — VALACYCLOVIR 500 MG: 500 TABLET, FILM COATED ORAL at 21:22

## 2017-03-05 RX ADMIN — OXYCODONE HYDROCHLORIDE AND ACETAMINOPHEN 2 TABLET: 5; 325 TABLET ORAL at 21:25

## 2017-03-05 RX ADMIN — IBUPROFEN 800 MG: 800 TABLET ORAL at 21:22

## 2017-03-05 RX ADMIN — OXYCODONE HYDROCHLORIDE AND ACETAMINOPHEN 2 TABLET: 5; 325 TABLET ORAL at 07:29

## 2017-03-05 RX ADMIN — OXYCODONE HYDROCHLORIDE AND ACETAMINOPHEN 1 TABLET: 5; 325 TABLET ORAL at 01:45

## 2017-03-05 RX ADMIN — DOCUSATE SODIUM 100 MG: 100 CAPSULE, LIQUID FILLED ORAL at 09:52

## 2017-03-05 NOTE — PLAN OF CARE
Problem: Patient Care Overview (Adult)  Goal: Plan of Care Review  Outcome: Ongoing (interventions implemented as appropriate)    03/05/17 0008   Coping/Psychosocial Response Interventions   Plan Of Care Reviewed With patient;significant other   Patient Care Overview   Progress unable to show any progress toward functional goals   Outcome Evaluation   Outcome Summary/Follow up Plan up and about on own, fundus firm, incision approximated with staples in place, bottle feeding infant well

## 2017-03-05 NOTE — PLAN OF CARE
Problem: Patient Care Overview (Adult)  Goal: Plan of Care Review  Outcome: Ongoing (interventions implemented as appropriate)    17 [id number]   Coping/Psychosocial Response Interventions   Plan Of Care Reviewed With patient   Patient Care Overview   Progress improving   Outcome Evaluation   Outcome Summary/Follow up Plan up to bathroom with assistance, tolerated activity well, voided, fundus firm, infant to nursery tonight per patients request, rested       Goal: Adult Individualization and Mutuality  Outcome: Ongoing (interventions implemented as appropriate)  Goal: Discharge Needs Assessment  Outcome: Ongoing (interventions implemented as appropriate)    Problem: Postpartum ( Delivery) (Adult)  Goal: Signs and Symptoms of Listed Potential Problems Will be Absent or Manageable (Postpartum)  Outcome: Ongoing (interventions implemented as appropriate)

## 2017-03-05 NOTE — PROGRESS NOTES
CC: Patient is postoperative day number 3.  S: Patient without complaints.  No events overnight.  She is tolerating a regular diet. Passing flatus. She is ambulating and voiding without difficulty.  Patient would like to stay in the hospital tomorrow.    O:   Vitals:    03/04/17 0706 03/04/17 1512 03/04/17 2310 03/05/17 0718   BP: 103/65 103/69 103/70 111/75   BP Location: Left arm Left arm Left arm Left arm   Patient Position: Lying Lying Lying Lying   Pulse: 90 93 103 104   Resp: 18 16 16 18   Temp: 97.9 °F (36.6 °C) 98.4 °F (36.9 °C) 98.2 °F (36.8 °C) 98.4 °F (36.9 °C)   TempSrc: Oral Oral Oral Oral   SpO2:       Weight:       Height:       General: No acute distress, awake and oriented ×3  Fundus: firm/Nt/below  Abdomen: Soft, nontender, nondistended, normoactive bowel sounds  Incision: Clean, dry, and intact with staples  Extremities: No Tenderness, no Homans sign, trace lower extremity edema          Invalid input(s): POTASSIUM, LABRCNTIP, CRCL, LABRCNTIP      CrCl cannot be calculated (Patient has no serum creatinine result on file.).    Results from last 7 days  Lab Units 03/03/17  0532 03/02/17  0830   WBC 10*3/mm3 15.43* 10.06   HEMOGLOBIN g/dL 9.5* 12.2   PLATELETS 10*3/mm3 210 265               Rh+, rubella immune, male infant status post circumcision      Current Facility-Administered Medications:   •  bisacodyl (DULCOLAX) suppository 10 mg, 10 mg, Rectal, Daily PRN, Panda Skelton MD  •  calcium carbonate (TUMS) chewable tablet 500 mg (200 mg elemental), 2 tablet, Oral, Q6H PRN, Panda Skelton MD  •  diphenhydrAMINE (BENADRYL) capsule 25 mg, 25 mg, Oral, Q4H PRN, 25 mg at 03/03/17 0934 **OR** diphenhydrAMINE (BENADRYL) injection 25 mg, 25 mg, Intravenous, Q4H PRN **OR** diphenhydrAMINE (BENADRYL) injection 25 mg, 25 mg, Intramuscular, Q4H PRN, Ana Bhandari MD  •  docusate sodium (COLACE) capsule 100 mg, 100 mg, Oral, BID PRN, Panda Skelton MD, 100 mg at 03/05/17 0952  •  ibuprofen  (ADVIL,MOTRIN) tablet 800 mg, 800 mg, Oral, Q8H PRN, Padna Skelton MD, 800 mg at 17 0952  •  misoprostol (CYTOTEC) tablet 600 mcg, 600 mcg, Oral, Once, Panda Skelton MD  •  naloxone (NARCAN) injection 0.2 mg, 0.2 mg, Intravenous, Q15 Min PRN, Ana Bhandari MD  •  ondansetron (ZOFRAN) injection 4 mg, 4 mg, Intravenous, Once PRN, Ana Bhandari MD  •  ondansetron (ZOFRAN) injection 4 mg, 4 mg, Intravenous, Q6H PRN, Panda Skelton MD  •  oxyCODONE-acetaminophen (PERCOCET) 5-325 MG per tablet 1 tablet, 1 tablet, Oral, Q4H PRN, Panda Skelton MD, 1 tablet at 17 0145  •  oxyCODONE-acetaminophen (PERCOCET) 5-325 MG per tablet 2 tablet, 2 tablet, Oral, Q4H PRN, Panda Skelton MD, 2 tablet at 17 0729  •  oxytocin (PITOCIN) 10 units in lactated Ringer's 500 mL IVPB solution, 999 mL/hr, Intravenous, Once **FOLLOWED BY** [] oxytocin (PITOCIN) 10 units in lactated Ringer's 500 mL IVPB solution, 125 mL/hr, Intravenous, Continuous PRN, Panda Skelton MD  •  prenatal (CLASSIC) vitamin tablet 1 tablet, 1 tablet, Oral, Daily, Panda Skelton MD, 1 tablet at 17 0923  •  promethazine (PHENERGAN) injection 12.5 mg, 12.5 mg, Intravenous, Q6H PRN **OR** promethazine (PHENERGAN) injection 12.5 mg, 12.5 mg, Intramuscular, Q6H PRN, Ana Bhandari MD  •  simethicone (MYLICON) chewable tablet 80 mg, 80 mg, Oral, 4x Daily PRN, Panda Skelton MD, 80 mg at 17 0952  •  valACYclovir (VALTREX) tablet 500 mg, 500 mg, Oral, BID, Panda Skelton MD, 500 mg at 17 0952    Assessment:  1.  21-year-old  1 para 1 status post primary low transverse  delivery, postoperative day #3  2.  Asymptomatic anemia, stable    Plan:  1.  Routine postoperative day 3 care.  Continue oral iron.  Likely discharge home tomorrow.  Remove staples tomorrow prior to discharge.  2. I spent 25 minutes on the floor in the care of this patient (reviewing the chart, consult  other physicians, direct patient care), with greater than 50 percent this time in direct counseling/coordination with the patient and her family.

## 2017-03-06 VITALS
HEIGHT: 62 IN | OXYGEN SATURATION: 100 % | HEART RATE: 86 BPM | TEMPERATURE: 98.4 F | DIASTOLIC BLOOD PRESSURE: 72 MMHG | SYSTOLIC BLOOD PRESSURE: 112 MMHG | BODY MASS INDEX: 35.92 KG/M2 | RESPIRATION RATE: 16 BRPM | WEIGHT: 195.2 LBS

## 2017-03-06 PROBLEM — Z37.9 NORMAL LABOR: Status: RESOLVED | Noted: 2017-03-02 | Resolved: 2017-03-06

## 2017-03-06 PROBLEM — Z34.90 PREGNANCY: Status: RESOLVED | Noted: 2017-02-21 | Resolved: 2017-03-06

## 2017-03-06 PROBLEM — Z34.90 PREGNANT: Status: RESOLVED | Noted: 2017-03-02 | Resolved: 2017-03-06

## 2017-03-06 PROCEDURE — 99238 HOSP IP/OBS DSCHRG MGMT 30/<: CPT | Performed by: OBSTETRICS & GYNECOLOGY

## 2017-03-06 RX ORDER — OXYCODONE HYDROCHLORIDE AND ACETAMINOPHEN 5; 325 MG/1; MG/1
1 TABLET ORAL EVERY 6 HOURS PRN
Qty: 30 TABLET | Refills: 0 | Status: SHIPPED | OUTPATIENT
Start: 2017-03-06 | End: 2017-03-13

## 2017-03-06 RX ORDER — IBUPROFEN 800 MG/1
800 TABLET ORAL EVERY 8 HOURS PRN
Qty: 30 TABLET | Refills: 0 | Status: SHIPPED | OUTPATIENT
Start: 2017-03-06 | End: 2018-04-04

## 2017-03-06 RX ORDER — PSEUDOEPHEDRINE HCL 30 MG
100 TABLET ORAL 2 TIMES DAILY PRN
Qty: 20 CAPSULE | Refills: 0 | Status: SHIPPED | OUTPATIENT
Start: 2017-03-06 | End: 2018-04-04

## 2017-03-06 RX ADMIN — VALACYCLOVIR 500 MG: 500 TABLET, FILM COATED ORAL at 09:12

## 2017-03-06 RX ADMIN — FERROUS SULFATE TAB 325 MG (65 MG ELEMENTAL FE) 325 MG: 325 (65 FE) TAB at 09:09

## 2017-03-06 RX ADMIN — IBUPROFEN 800 MG: 800 TABLET ORAL at 05:28

## 2017-03-06 RX ADMIN — SIMETHICONE CHEW TAB 80 MG 80 MG: 80 TABLET ORAL at 09:12

## 2017-03-06 RX ADMIN — OXYCODONE HYDROCHLORIDE AND ACETAMINOPHEN 1 TABLET: 5; 325 TABLET ORAL at 02:18

## 2017-03-06 RX ADMIN — DOCUSATE SODIUM 100 MG: 100 CAPSULE, LIQUID FILLED ORAL at 09:10

## 2017-03-06 RX ADMIN — OXYCODONE HYDROCHLORIDE AND ACETAMINOPHEN 2 TABLET: 5; 325 TABLET ORAL at 06:24

## 2017-03-06 RX ADMIN — OXYCODONE HYDROCHLORIDE AND ACETAMINOPHEN 2 TABLET: 5; 325 TABLET ORAL at 10:56

## 2017-03-06 NOTE — DISCHARGE SUMMARY
Date of Discharge:  3/6/2017    Discharge Diagnosis: Term pregnancy, delivered.  Arrest of descent    Presenting Problem/History of Present Illness  Pregnant [Z33.1]  Pregnancy [Z33.1]       Hospital Course  Patient is a 21 y.o. female presented with active labor at term.  She expressed an arrest of descent and was taken for  delivery.  A vigorous  was delivered.  The postoperative course was smooth.  By postoperative day #4, the patient was afebrile, tolerating a regular diet and her incision was healing well.  At this point the patient requested discharge and I felt that she was stable for this.  Her staples will be removed and Steri-Strips placed prior to discharge.      Procedures Performed  Procedure(s):   SECTION PRIMARY       Consults:   Consults     No orders found from 2017 to 3/3/2017.            Condition on Discharge:  Stable    Vital Signs  Temp:  [98.1 °F (36.7 °C)-98.5 °F (36.9 °C)] 98.4 °F (36.9 °C)  Heart Rate:  [78-89] 86  Resp:  [16-18] 16  BP: (104-112)/(66-72) 112/72    Physical Exam:   Abdomen is soft and nondistended.  It is nontender to palpation.  Incision is well approximated.  Erythema and induration are absent.  External ears are equal in size with minimal edema    Discharge Disposition  Home or Self Care    Discharge Medications   Caroline Conley   Home Medication Instructions ARRON:[id number]    Printed on:17 1014   Medication Information                      docusate sodium 100 MG capsule  Take 100 mg by mouth 2 (Two) Times a Day As Needed for constipation.             ibuprofen (ADVIL,MOTRIN) 800 MG tablet  Take 1 tablet by mouth Every 8 (Eight) Hours As Needed for mild pain (1-3).             oxyCODONE-acetaminophen (PERCOCET) 5-325 MG per tablet  Take 1 tablet by mouth Every 6 (Six) Hours As Needed for moderate pain (4-6) for up to 7 days.             prenatal vitamins (PRENATAL 27-1) 27-1 MG tablet tablet  Take 1 tablet by mouth.              valACYclovir (VALTREX) 500 MG tablet  Take 500 mg by mouth 2 (Two) Times a Day.                 Discharge Diet:     Activity at Discharge:     Follow-up Appointments  Future Appointments  Date Time Provider Department Center   3/8/2017 1:45 PM RUSLAN Ramires [facility] None   3/13/2017 1:20 PM Axel Espinoza MD [facility] None     Additional Instructions for the Follow-ups that You Need to Schedule     Call MD With Problems / Concerns    As directed    Instructions: Call for fever, chills, heavy bleeding, severe pain or any other concerns       Discharge Follow-Up With Specified Provider    As directed    To:  Dr. Espinoza in 2 weeks [id number]   Follow Up:  2 Weeks                 Test Results Pending at Discharge       Beni Chaidez MD  03/06/17  10:14 AM    Time: Discharge 20 min

## 2017-03-06 NOTE — PLAN OF CARE
Problem: Patient Care Overview (Adult)  Goal: Plan of Care Review  Outcome: Ongoing (interventions implemented as appropriate)    17 0255   Coping/Psychosocial Response Interventions   Plan Of Care Reviewed With patient   Patient Care Overview   Progress improving   Outcome Evaluation   Outcome Summary/Follow up Plan VSS, assessment WDL, rooming-in promoted, bottle feeding infant well, will continue to monitor.       Goal: Adult Individualization and Mutuality  Outcome: Ongoing (interventions implemented as appropriate)  Goal: Discharge Needs Assessment  Outcome: Ongoing (interventions implemented as appropriate)    Problem: Postpartum ( Delivery) (Adult)  Goal: Signs and Symptoms of Listed Potential Problems Will be Absent or Manageable (Postpartum)  Outcome: Ongoing (interventions implemented as appropriate)

## 2017-03-15 ENCOUNTER — POSTPARTUM VISIT (OUTPATIENT)
Dept: OBSTETRICS AND GYNECOLOGY | Facility: CLINIC | Age: 22
End: 2017-03-15

## 2017-03-15 VITALS
BODY MASS INDEX: 31.93 KG/M2 | DIASTOLIC BLOOD PRESSURE: 72 MMHG | SYSTOLIC BLOOD PRESSURE: 118 MMHG | WEIGHT: 173.5 LBS | HEIGHT: 62 IN

## 2017-03-15 PROCEDURE — 99212 OFFICE O/P EST SF 10 MIN: CPT | Performed by: OBSTETRICS & GYNECOLOGY

## 2017-03-15 RX ORDER — ETHYNODIOL DIACETATE AND ETHINYL ESTRADIOL 1 MG-35MCG
1 KIT ORAL DAILY
Qty: 28 TABLET | Refills: 12
Start: 2017-03-15 | End: 2019-12-11

## 2017-03-15 NOTE — PROGRESS NOTES
Subjective  CC postop check   Caroline Conley is a 21 y.o. female.     History of Present Illness patient here today for 2 week postop  visit with no problems.  Patient currently bottlefeeding and wants to resume Zovia 135 oral contraceptive pills.      Review of Systems  Negative  Objective   Physical Exam  Incision healing well with no evidence of infection.  Assessment/Plan   Caroline was seen today for post-op follow-up.    Diagnoses and all orders for this visit:    Encounter for postpartum visit    Other orders  -     ethynodiol-ethinyl estradiol (ZOVIA , 28,) 1-35 MG-MCG per tablet; Take 1 tablet by mouth Daily.     RTO 4 weeks

## 2017-06-26 ENCOUNTER — HOSPITAL ENCOUNTER (EMERGENCY)
Dept: HOSPITAL 23 - SED | Age: 22
Discharge: HOME | End: 2017-06-26
Payer: COMMERCIAL
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
## 2017-06-26 DIAGNOSIS — S62.635A: Primary | ICD-10-CM
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
## 2017-08-07 ENCOUNTER — HOSPITAL ENCOUNTER (EMERGENCY)
Dept: HOSPITAL 23 - SED | Age: 22
Discharge: HOME | End: 2017-08-07
Payer: COMMERCIAL

## 2017-08-07 DIAGNOSIS — K21.9: ICD-10-CM

## 2017-08-07 DIAGNOSIS — J06.9: Primary | ICD-10-CM

## 2017-09-07 ENCOUNTER — HOSPITAL ENCOUNTER (EMERGENCY)
Dept: HOSPITAL 23 - SED | Age: 22
LOS: 1 days | Discharge: HOME | End: 2017-09-08
Payer: COMMERCIAL

## 2017-09-07 VITALS — HEIGHT: 62 IN | BODY MASS INDEX: 29.99 KG/M2 | WEIGHT: 162.99 LBS

## 2017-09-07 DIAGNOSIS — A59.01: Primary | ICD-10-CM

## 2017-09-07 DIAGNOSIS — Z88.1: ICD-10-CM

## 2017-09-07 DIAGNOSIS — K21.9: ICD-10-CM

## 2017-09-07 LAB
BARBITURATES UR QL SCN: 0.6 MG/DL (ref 0.2–2)
BARBITURATES UR QL SCN: 3.8 G/DL (ref 3.5–5)
BASOPHIL#: 0 X10E3 (ref 0–0.3)
BASOPHIL%: 0.4 % (ref 0–2.5)
BENZODIAZ UR QL SCN: 20 U/L (ref 10–42)
BENZODIAZ UR QL SCN: 38 U/L (ref 10–40)
BLOOD UREA NITROGEN: 15 MG/DL (ref 9–23)
BUN/CREATININE RATIO: 21.42
BZE UR QL SCN: 62 U/L (ref 32–92)
CALCIUM SERUM: 8.9 MG/DL (ref 8.4–10.2)
CK MB SERPL-RTO: 14.4 % (ref 11–15.5)
CK MB SERPL-RTO: 33.9 G/DL (ref 30–36)
CREATININE SERUM: 0.7 MG/DL (ref 0.6–1.4)
DIFF IND: NO
EOSINOPHIL#: 0.2 X10E3 (ref 0–0.7)
EOSINOPHIL%: 2.8 % (ref 0–7)
GENTAMICIN PEAK SERPL-MCNC: YES MG/L
GLOM FILT RATE ESTIMATED: 123 ML/MIN (ref 60–?)
GLUCOSE FASTING: 93 MG/DL (ref 70–110)
HEMATOCRIT: 39.8 % (ref 35–45)
HEMOGLOBIN: 13.5 GM/DL (ref 12–16)
KETONES UR QL: 105 MMOL/L (ref 100–111)
KETONES UR QL: 24 MMOL/L (ref 22–31)
LIPASE: 31 U/L (ref 22–51)
LYMPHOCYTE#: 2 X10E3 (ref 1–3.5)
LYMPHOCYTE%: 32.9 % (ref 17–45)
MEAN CELL VOLUME: 89.9 FL (ref 83–96)
MEAN CORPUSCULAR HEMOGLOBIN: 30.5 PG (ref 28–34)
MEAN PLATELET VOLUME: 7.4 FL (ref 6.5–11.5)
MICRO INDICATED?: YES
MONOCYTE#: 0.4 X10E3 (ref 0–1)
MONOCYTE%: 7 % (ref 3–12)
NEUTROPHIL#: 3.5 X10E3 (ref 1.5–7.1)
NEUTROPHIL%: 56.9 % (ref 40–75)
PLATELET COUNT: 300 X10E3 (ref 140–420)
POTASSIUM: 4.1 MMOL/L (ref 3.5–5.1)
PROTEIN TOTAL SERUM: 7.1 G/DL (ref 6–8.3)
RED BLOOD COUNT: 4.43 X10E (ref 3.9–5.3)
SODIUM: 136 MMOL/L (ref 135–145)
URINE APPEARANCE: (no result)
URINE BACTERIA: (no result)
URINE BILIRUBIN: (no result)
URINE BLOOD: (no result)
URINE COLOR: YELLOW
URINE GLUCOSE: (no result) MG/DL
URINE KETONE: (no result)
URINE LEUKOCYTE ESTERASE: (no result)
URINE MUCUS: PRESENT
URINE NITRATE: (no result)
URINE PH: 6 (ref 5–8)
URINE PROTEIN: (no result)
URINE RBC: (no result) /[HPF] (ref 0–2)
URINE SOURCE: (no result)
URINE SPECIFIC GRAVITY: 1.02 (ref 1–1.03)
URINE SQUAMOUS EPITHELIAL CELL: (no result) /[HPF]
URINE TRICHOMONAS: PRESENT
URINE UROBILINOGEN: 0.2 MG/DL
URINE WBC: (no result) /[HPF] (ref 0–5)
WHITE BLOOD COUNT: 6.2 X10E3 (ref 4–10.5)

## 2018-04-04 ENCOUNTER — OFFICE VISIT (OUTPATIENT)
Dept: OBSTETRICS AND GYNECOLOGY | Facility: CLINIC | Age: 23
End: 2018-04-04

## 2018-04-04 VITALS
WEIGHT: 161 LBS | SYSTOLIC BLOOD PRESSURE: 116 MMHG | DIASTOLIC BLOOD PRESSURE: 70 MMHG | HEIGHT: 62 IN | BODY MASS INDEX: 29.63 KG/M2

## 2018-04-04 DIAGNOSIS — R10.9 ABDOMINAL PAIN, UNSPECIFIED ABDOMINAL LOCATION: ICD-10-CM

## 2018-04-04 DIAGNOSIS — R06.02 SHORTNESS OF BREATH: ICD-10-CM

## 2018-04-04 DIAGNOSIS — N92.1 BREAKTHROUGH BLEEDING ON BIRTH CONTROL PILLS: ICD-10-CM

## 2018-04-04 DIAGNOSIS — Z01.419 ENCOUNTER FOR GYNECOLOGICAL EXAMINATION WITHOUT ABNORMAL FINDING: Primary | ICD-10-CM

## 2018-04-04 PROCEDURE — 99213 OFFICE O/P EST LOW 20 MIN: CPT | Performed by: OBSTETRICS & GYNECOLOGY

## 2018-04-04 PROCEDURE — 99395 PREV VISIT EST AGE 18-39: CPT | Performed by: OBSTETRICS & GYNECOLOGY

## 2018-04-04 NOTE — PROGRESS NOTES
Subjective    Chief Complaint   Patient presents with   • Gynecologic Exam   • Menstrual Problem     HAVING 3 A MONTH. DARK BROWN BLOOD.    • Shortness of Breath   • Abdominal Pain      History of Present Illness    Caroline Conley is a 22 y.o. female who presents for annual exam. Patient states she has had breakthrough bleeding for several months and has had trouble shortness of breath.  She had a CT scan at [facility] on [facility] which had some abnormality although no definite blood clot but was supposed to have a repeat 4 weeks later.  Patient told to stop her birth control pill today and to call her PCP and get her repeat CT scan rescheduled.  She does not smoke.  She has had some abdominal pain periodically and does want STD check.    Obstetric History:  OB History      Para Term  AB Living    1 1 1   1    SAB TAB Ectopic Molar Multiple Live Births        0 1         Menstrual History:     Patient's last menstrual period was 2016.       Past Medical History:   Diagnosis Date   • Herpes     last outbreak , no current outbrek, FOB does not know   • Hyperemesis gravidarum    • Migraine      History reviewed. No pertinent family history.    The following portions of the patient's history were reviewed and updated as appropriate: allergies, current medications, past family history, past medical history, past social history, past surgical history and problem list.    Review of Systems   Constitutional: Negative.  Negative for fever and unexpected weight change.   HENT: Negative.    Respiratory: Positive for shortness of breath. Negative for wheezing.    Cardiovascular: Negative for chest pain, palpitations and leg swelling.   Gastrointestinal: Positive for abdominal pain. Negative for anal bleeding and blood in stool.   Genitourinary: Positive for menstrual problem. Negative for dysuria, pelvic pain, urgency, vaginal bleeding, vaginal discharge and vaginal pain.   Skin: Negative.   "  Neurological: Negative.    Hematological: Negative.  Negative for adenopathy.   Psychiatric/Behavioral: Negative.  Negative for dysphoric mood. The patient is not nervous/anxious.             Objective   Physical Exam   Constitutional: She is oriented to person, place, and time. Vital signs are normal. She appears well-developed and well-nourished.   HENT:   Head: Normocephalic.   Neck: Trachea normal. No tracheal deviation present. No thyromegaly present.   Cardiovascular: Normal rate, regular rhythm and normal heart sounds.    No murmur heard.  Pulmonary/Chest: Effort normal and breath sounds normal. No apnea. No respiratory distress. She has no decreased breath sounds. She has no wheezes. She has no rhonchi. She has no rales.   Breasts without masses, tenderness or nipple discharge. Lungs clear today   Abdominal: Soft. Normal appearance. She exhibits no mass. There is no hepatosplenomegaly. There is no tenderness. No hernia.   Genitourinary: Rectum normal, vagina normal and uterus normal. Uterus is not enlarged and not tender. Cervix exhibits no motion tenderness. Right adnexum displays no mass and no tenderness. Left adnexum displays no mass and no tenderness. No vaginal discharge found.   Genitourinary Comments: External genitalia normal    Lymphadenopathy:     She has no cervical adenopathy.     She has no axillary adenopathy.   Neurological: She is alert and oriented to person, place, and time.   Skin: Skin is warm and dry. No rash noted.   Psychiatric: She has a normal mood and affect. Her behavior is normal. Cognition and memory are normal.       /70   Ht 157.5 cm (62.01\")   Wt 73 kg (161 lb)   LMP 05/30/2016   BMI 29.44 kg/m²     Assessment/Plan   Caroline was seen today for gynecologic exam, menstrual problem, shortness of breath and abdominal pain.    Diagnoses and all orders for this visit:    Encounter for gynecological examination without abnormal finding  -     IGP,CtNg,rfx Apt HPV All " Pth    Breakthrough bleeding on birth control pills    Abdominal pain, unspecified abdominal location    Shortness of breath        Patient will stop her OCPs, Zovia 135, and return to office 2 weeks for pelvic ultrasound.  We will try to obtain a copy of her CT scan in the meantime.    Counseled concerning birth control options, evaluation of irregular bleeding, need for repeat CT scan.     an additional 15 minutes was spent face-to-face discussing the workup for abnormal bleeding, abdominal pain

## 2018-04-06 LAB
C TRACH RRNA CVX QL NAA+PROBE: NEGATIVE
CONV .: NORMAL
CYTOLOGIST CVX/VAG CYTO: NORMAL
CYTOLOGY CVX/VAG DOC THIN PREP: NORMAL
DX ICD CODE: NORMAL
HIV 1 & 2 AB SER-IMP: NORMAL
N GONORRHOEA RRNA CVX QL NAA+PROBE: NEGATIVE
OTHER STN SPEC: NORMAL
PATH REPORT.FINAL DX SPEC: NORMAL
STAT OF ADQ CVX/VAG CYTO-IMP: NORMAL

## 2018-04-18 ENCOUNTER — OFFICE VISIT (OUTPATIENT)
Dept: OBSTETRICS AND GYNECOLOGY | Facility: CLINIC | Age: 23
End: 2018-04-18

## 2018-04-18 ENCOUNTER — PROCEDURE VISIT (OUTPATIENT)
Dept: OBSTETRICS AND GYNECOLOGY | Facility: CLINIC | Age: 23
End: 2018-04-18

## 2018-04-18 VITALS
DIASTOLIC BLOOD PRESSURE: 64 MMHG | WEIGHT: 165.2 LBS | HEIGHT: 62 IN | BODY MASS INDEX: 30.4 KG/M2 | SYSTOLIC BLOOD PRESSURE: 110 MMHG

## 2018-04-18 DIAGNOSIS — N93.9 ABNORMAL UTERINE BLEEDING (AUB): Primary | ICD-10-CM

## 2018-04-18 DIAGNOSIS — R10.2 PELVIC PAIN: ICD-10-CM

## 2018-04-18 DIAGNOSIS — N92.6 IRREGULAR BLEEDING: Primary | ICD-10-CM

## 2018-04-18 PROCEDURE — 76830 TRANSVAGINAL US NON-OB: CPT | Performed by: OBSTETRICS & GYNECOLOGY

## 2018-04-18 PROCEDURE — 99213 OFFICE O/P EST LOW 20 MIN: CPT | Performed by: OBSTETRICS & GYNECOLOGY

## 2018-04-18 NOTE — PROGRESS NOTES
"Subjective    Chief Complaint   Patient presents with   • Follow-up     fu, having irreg bleeding & lt lower abd pain discuss us      History of Present Illness    Caroline Conley is a 22 y.o. female who presents for Evaluation of irregular bleeding and occasional pelvic pain.  Ultrasound today showed 1 cm endometrial stripe and normal ovaries. Patient states that since she stopped her pills    Irregular bleeding and pain has resolved except for occasional cramping with diarrhea.  CT scan obtained of the chest showed no evidence of pulmonary emboli.  She would like to go back on a birth control pill.  Discussed normal Pap and STD labs    Obstetric History:  OB History      Para Term  AB Living    1 1 1   1    SAB TAB Ectopic Molar Multiple Live Births        0 1         Menstrual History:     Patient's last menstrual period was 2016.       Past Medical History:   Diagnosis Date   • Herpes     last outbreak , no current outbrek, FOB does not know   • Hyperemesis gravidarum    • Migraine      No family history on file.    The following portions of the patient's history were reviewed and updated as appropriate: allergies, current medications, past medical history, past surgical history and problem list.    Review of Systems  As per hPI       Objective   Physical Exam  Abdominal exam today is totally benign with no tenderness masses etc.  Pelvic exam was not repeated.  /64   Ht 157.5 cm (62\")   Wt 74.9 kg (165 lb 3.2 oz)   LMP 2016   BMI 30.22 kg/m²     Assessment/Plan   Caroline was seen today for follow-up.    Diagnoses and all orders for this visit:    Irregular bleeding    Pelvic pain    Other orders  -     norgestrel-ethinyl estradiol (LOW-OGESTREL) 0.3-30 MG-MCG per tablet; Take 1 tablet by mouth Daily.        RTO AE     15 minute visit today of which 10 minutes was face-to-face counseling concerning the evaluation and treatment of abnormal bleeding and pelvic pain.  Patient " would like to go on a birth control pill again and is feeling much better since she got off the Zovia 135.  She knows to call if she has problems with her new pill.

## 2018-08-13 RX ORDER — PROMETHAZINE HYDROCHLORIDE 25 MG/1
TABLET ORAL
Qty: 30 TABLET | Refills: 1 | Status: SHIPPED | OUTPATIENT
Start: 2018-08-13 | End: 2020-11-19

## 2019-07-12 ENCOUNTER — TELEPHONE (OUTPATIENT)
Dept: OBSTETRICS AND GYNECOLOGY | Facility: CLINIC | Age: 24
End: 2019-07-12

## 2019-07-17 ENCOUNTER — TELEPHONE (OUTPATIENT)
Dept: OBSTETRICS AND GYNECOLOGY | Facility: CLINIC | Age: 24
End: 2019-07-17

## 2019-07-17 NOTE — TELEPHONE ENCOUNTER
Can work her in at [facility] Tuesday if she realizes I can be pulled for a delivery. Thanks REY

## 2019-07-17 NOTE — TELEPHONE ENCOUNTER
Pt called and is asking for 08/14 GYN FU appt be moved up. She said she is itching, swollen and has an odor in and around vagina. Please advise.       Thank you

## 2019-07-25 ENCOUNTER — TELEPHONE (OUTPATIENT)
Dept: OBSTETRICS AND GYNECOLOGY | Facility: CLINIC | Age: 24
End: 2019-07-25

## 2019-09-11 ENCOUNTER — OFFICE VISIT (OUTPATIENT)
Dept: OBSTETRICS AND GYNECOLOGY | Facility: CLINIC | Age: 24
End: 2019-09-11

## 2019-09-11 VITALS
BODY MASS INDEX: 29.08 KG/M2 | SYSTOLIC BLOOD PRESSURE: 110 MMHG | HEIGHT: 62 IN | WEIGHT: 158 LBS | DIASTOLIC BLOOD PRESSURE: 60 MMHG

## 2019-09-11 DIAGNOSIS — N89.8 VAGINAL DISCHARGE: ICD-10-CM

## 2019-09-11 DIAGNOSIS — Z30.41 ENCOUNTER FOR SURVEILLANCE OF CONTRACEPTIVE PILLS: ICD-10-CM

## 2019-09-11 DIAGNOSIS — Z01.411 ENCOUNTER FOR GYNECOLOGICAL EXAMINATION WITH ABNORMAL FINDING: Primary | ICD-10-CM

## 2019-09-11 PROCEDURE — 99395 PREV VISIT EST AGE 18-39: CPT | Performed by: OBSTETRICS & GYNECOLOGY

## 2019-09-11 RX ORDER — ONDANSETRON 4 MG/1
TABLET, ORALLY DISINTEGRATING ORAL
Refills: 0 | COMMUNITY
Start: 2019-07-16 | End: 2021-02-24

## 2019-09-11 RX ORDER — ETHYNODIOL DIACETATE AND ETHINYL ESTRADIOL 1 MG-35MCG
1 KIT ORAL DAILY
Qty: 28 TABLET | Refills: 12 | Status: SHIPPED | OUTPATIENT
Start: 2019-09-11 | End: 2020-08-02

## 2019-09-11 RX ORDER — LORAZEPAM 0.5 MG/1
0.5 TABLET ORAL EVERY 8 HOURS PRN
COMMUNITY
End: 2019-12-11

## 2019-09-11 NOTE — PROGRESS NOTES
Subjective    Chief Complaint   Patient presents with   • Gynecologic Exam     AE, PT STATES PAP DONE 3-4 MONTHS AGO AND NORMAL, HAVING DISCHARGE AND ODOR      History of Present Illness    Caroline Conley is a 23 y.o. female who presents for annual exam.  Non-smoker.  Patient had a Pap by her PCP a few months ago and we are calling to get a report but she received a notice saying it was negative.  She was treated for positive chlamydia month ago and now has a vaginal discharge and would like another culture performed.  She is on Zovia 135  OCPs.    Obstetric History:  OB History      Para Term  AB Living    1 1 1     1    SAB TAB Ectopic Molar Multiple Live Births            0 1         Menstrual History:     No LMP recorded.       Past Medical History:   Diagnosis Date   • Herpes     last outbreak , no current outbrek, FOB does not know   • Hyperemesis gravidarum    • Migraine    • Seizure (CMS/HCC)      History reviewed. No pertinent family history.  Social History     Tobacco Use   Smoking Status Never Smoker   Smokeless Tobacco Never Used     [unfilled]    The following portions of the patient's history were reviewed and updated as appropriate: allergies, current medications, past family history, past medical history, past social history, past surgical history and problem list.    Review of Systems   Constitutional: Negative.  Negative for fever and unexpected weight change.   HENT: Negative.    Respiratory: Negative for shortness of breath and wheezing.    Cardiovascular: Negative for chest pain, palpitations and leg swelling.   Gastrointestinal: Negative for abdominal pain, anal bleeding and blood in stool.   Genitourinary: Positive for vaginal discharge. Negative for dysuria, pelvic pain, urgency, vaginal bleeding and vaginal pain.   Skin: Negative.    Neurological: Negative.    Hematological: Negative.  Negative for adenopathy.   Psychiatric/Behavioral: Negative.  Negative for dysphoric  "mood. The patient is not nervous/anxious.             Objective   Physical Exam   Constitutional: She is oriented to person, place, and time. Vital signs are normal. She appears well-developed and well-nourished.   HENT:   Head: Normocephalic.   Neck: Trachea normal. No tracheal deviation present. No thyromegaly present.   Cardiovascular: Normal rate, regular rhythm and normal heart sounds.   No murmur heard.  Pulmonary/Chest: Effort normal and breath sounds normal.   Breasts without masses, tenderness or nipple discharge   Abdominal: Soft. Normal appearance. She exhibits no mass. There is no hepatosplenomegaly. There is no tenderness. No hernia.   Genitourinary: Rectum normal, vagina normal and uterus normal. Uterus is not enlarged and not tender. Cervix exhibits no motion tenderness. Right adnexum displays no mass and no tenderness. Left adnexum displays no mass and no tenderness. No vaginal discharge found.   Genitourinary Comments: External genitalia normal.  Copious white discharge.  Vaginal culture performed.    Lymphadenopathy:     She has no cervical adenopathy.     She has no axillary adenopathy.   Neurological: She is alert and oriented to person, place, and time.   Skin: Skin is warm and dry. No rash noted.   Psychiatric: She has a normal mood and affect. Her behavior is normal. Cognition and memory are normal.       /60   Ht 157.5 cm (62\")   Wt 71.7 kg (158 lb)   BMI 28.90 kg/m²     Assessment/Plan   Caroline was seen today for gynecologic exam.    Diagnoses and all orders for this visit:    Encounter for gynecological examination with abnormal finding  -     IGP,rfx Aptima HPV All Pth    Vaginal discharge  -     NuSwab VG+ - Swab, Vagina    Encounter for surveillance of contraceptive pills    Other orders  -     ethynodiol-ethinyl estradiol (ZOVIA 1/35E, 28,) 1-35 MG-MCG per tablet; Take 1 tablet by mouth Daily.        Return to office 1 year.  Patient to call 1 week for results.    Counseled " about diet and exercise.

## 2019-09-16 ENCOUNTER — TELEPHONE (OUTPATIENT)
Dept: OBSTETRICS AND GYNECOLOGY | Facility: CLINIC | Age: 24
End: 2019-09-16

## 2019-09-16 LAB
A VAGINAE DNA VAG QL NAA+PROBE: ABNORMAL SCORE
BVAB2 DNA VAG QL NAA+PROBE: ABNORMAL SCORE
C ALBICANS DNA VAG QL NAA+PROBE: POSITIVE
C GLABRATA DNA VAG QL NAA+PROBE: NEGATIVE
C TRACH RRNA SPEC QL NAA+PROBE: NEGATIVE
MEGA1 DNA VAG QL NAA+PROBE: ABNORMAL SCORE
N GONORRHOEA RRNA SPEC QL NAA+PROBE: NEGATIVE
T VAGINALIS RRNA SPEC QL NAA+PROBE: NEGATIVE

## 2019-09-16 NOTE — TELEPHONE ENCOUNTER
Please tell pt vag culture only showed yeast and Terazol-7 sent to her pharmacy. Chlamydia was neg. Thanks REY

## 2019-09-16 NOTE — TELEPHONE ENCOUNTER
Attempted to reach pt regarding results. Will not go through to a voicemail to leave a message. SM

## 2019-11-15 ENCOUNTER — TELEPHONE (OUTPATIENT)
Dept: OBSTETRICS AND GYNECOLOGY | Facility: CLINIC | Age: 24
End: 2019-11-15

## 2019-12-11 ENCOUNTER — OFFICE VISIT (OUTPATIENT)
Dept: OBSTETRICS AND GYNECOLOGY | Facility: CLINIC | Age: 24
End: 2019-12-11

## 2019-12-11 VITALS
BODY MASS INDEX: 29.08 KG/M2 | SYSTOLIC BLOOD PRESSURE: 100 MMHG | HEIGHT: 62 IN | DIASTOLIC BLOOD PRESSURE: 70 MMHG | WEIGHT: 158 LBS

## 2019-12-11 DIAGNOSIS — N92.6 IRREGULAR MENSES: ICD-10-CM

## 2019-12-11 DIAGNOSIS — N64.4 BREAST TENDERNESS: Primary | ICD-10-CM

## 2019-12-11 DIAGNOSIS — R30.9 URINARY PAIN: ICD-10-CM

## 2019-12-11 DIAGNOSIS — N89.8 VAGINAL DISCHARGE: ICD-10-CM

## 2019-12-11 PROCEDURE — 99213 OFFICE O/P EST LOW 20 MIN: CPT | Performed by: OBSTETRICS & GYNECOLOGY

## 2019-12-11 RX ORDER — OMEPRAZOLE 20 MG/1
20 CAPSULE, DELAYED RELEASE ORAL DAILY
COMMUNITY
Start: 2019-05-20 | End: 2020-08-02

## 2019-12-11 RX ORDER — CETIRIZINE HYDROCHLORIDE 10 MG/1
TABLET ORAL DAILY
Refills: 0 | COMMUNITY
Start: 2019-11-16 | End: 2020-08-02

## 2019-12-14 LAB
BACTERIA UR CULT: ABNORMAL
BACTERIA UR CULT: ABNORMAL
OTHER ANTIBIOTIC SUSC ISLT: ABNORMAL

## 2019-12-15 LAB
A VAGINAE DNA VAG QL NAA+PROBE: NORMAL SCORE
BVAB2 DNA VAG QL NAA+PROBE: NORMAL SCORE
C ALBICANS DNA VAG QL NAA+PROBE: NEGATIVE
C GLABRATA DNA VAG QL NAA+PROBE: NEGATIVE
C TRACH RRNA SPEC QL NAA+PROBE: NEGATIVE
MEGA1 DNA VAG QL NAA+PROBE: NORMAL SCORE
N GONORRHOEA RRNA SPEC QL NAA+PROBE: NEGATIVE
T VAGINALIS RRNA SPEC QL NAA+PROBE: NEGATIVE

## 2019-12-16 ENCOUNTER — TELEPHONE (OUTPATIENT)
Dept: OBSTETRICS AND GYNECOLOGY | Facility: CLINIC | Age: 24
End: 2019-12-16

## 2019-12-16 LAB
CONV .: NORMAL
CYTOLOGIST CVX/VAG CYTO: NORMAL
CYTOLOGY CVX/VAG DOC CYTO: NORMAL
CYTOLOGY CVX/VAG DOC THIN PREP: NORMAL
DX ICD CODE: NORMAL
HIV 1 & 2 AB SER-IMP: NORMAL
Lab: NORMAL
OTHER STN SPEC: NORMAL
STAT OF ADQ CVX/VAG CYTO-IMP: NORMAL

## 2019-12-16 RX ORDER — NITROFURANTOIN 25; 75 MG/1; MG/1
100 CAPSULE ORAL 2 TIMES DAILY
Qty: 14 CAPSULE | Refills: 0 | Status: SHIPPED | OUTPATIENT
Start: 2019-12-16 | End: 2019-12-23

## 2019-12-16 NOTE — TELEPHONE ENCOUNTER
Please tell patient she does have UTI and I have sent Macrobid a proper antibiotic for it to her pharmacy.

## 2020-01-07 ENCOUNTER — TELEPHONE (OUTPATIENT)
Dept: OBSTETRICS AND GYNECOLOGY | Facility: CLINIC | Age: 25
End: 2020-01-07

## 2020-01-07 NOTE — TELEPHONE ENCOUNTER
Good Afternoon,    Patient called to schedule an appointment, said she her breasts are painful and swollen. She is not pregnant and has seen her PCP and they recommended she come in and see you. She requested an appointment out on [place] and the first available was towards the end of February. Patient was okay with waiting, but wasn't sure if this would be okay or if we should try to get her in earlier.     Thanks!

## 2020-01-22 ENCOUNTER — OFFICE VISIT (OUTPATIENT)
Dept: OBSTETRICS AND GYNECOLOGY | Facility: CLINIC | Age: 25
End: 2020-01-22

## 2020-01-22 DIAGNOSIS — N94.6 DYSMENORRHEA: ICD-10-CM

## 2020-01-22 DIAGNOSIS — F41.0 PANIC ATTACKS: ICD-10-CM

## 2020-01-22 DIAGNOSIS — N64.4 BREAST TENDERNESS: ICD-10-CM

## 2020-01-22 DIAGNOSIS — N94.3 PMS (PREMENSTRUAL SYNDROME): Primary | ICD-10-CM

## 2020-01-22 PROCEDURE — 99214 OFFICE O/P EST MOD 30 MIN: CPT | Performed by: OBSTETRICS & GYNECOLOGY

## 2020-01-22 NOTE — PROGRESS NOTES
Subjective    Chief Complaint   Patient presents with   • Follow-up     Pt states anxiety attacks       History of Present Illness    Caroline Conley is a 24 y.o. female who presents for severe PMS issues in breast tenderness related to her menses.  She gets panic attacks just prior to her menstrual cycles and even passes out.  She also does get a lot of pelvic cramping with her menses.  She is currently on Zovia 135 which may be causing her increased breast tenderness.  She was seen in the emergency room at  [facility] and had a full evaluation including an ultrasound of the pelvis which was totally normal and vaginal cultures which were negative for infection.  She has not been treated for panic attacks but is trying to get an appointment with a psychiatrist and I explained to her that is very important, even if they just come at times of her menstrual cycles.    Obstetric History:  OB History        1    Para   1    Term   1            AB        Living   1       SAB        TAB        Ectopic        Molar        Multiple   0    Live Births   1               Menstrual History:     No LMP recorded.       Past Medical History:   Diagnosis Date   • Herpes     last outbreak , no current outbrek, FOB does not know   • Hyperemesis gravidarum    • Migraine    • Panic attack    • Seizure (CMS/HCC)      History reviewed. No pertinent family history.    The following portions of the patient's history were reviewed and updated as appropriate: allergies, current medications, past family history, past medical history, past social history, past surgical history and problem list.    Review of Systems  As per HPI       Objective   Physical Exam  Breast exam today totally normal with no evidence of any discharge, masses, or extreme tenderness.  Abdominal exam today totally negative for any mass or tenderness.  There were no vitals taken for this visit.    Assessment/Plan   Caroline was seen today for  follow-up.    Diagnoses and all orders for this visit:    PMS (premenstrual syndrome)    Panic attacks    Breast tenderness    Dysmenorrhea        Impression and plan.  25-minute visit today of which 20 minutes was face-to-face counseling concerning the above issues she is having and possible remedies.  We have decided she should see a psychiatrist for her panic attacks and she is trying to make an appointment.  We also discussed going off OCPs with estrogen as that may be contributing to her breast tenderness.  Various other options including Depo-Provera, which she is does not want due to weight gain, and Nexplanon and Mirena IUD discussed as a means of stopping her menses and therefore possibly decreasing her dysmenorrhea and other related symptoms.  Patient is interested in Mirena IUD and one is being ordered.  She will call with her next menstrual cycle for insertion.

## 2020-01-30 ENCOUNTER — TELEPHONE (OUTPATIENT)
Dept: OBSTETRICS AND GYNECOLOGY | Facility: CLINIC | Age: 25
End: 2020-01-30

## 2020-01-30 NOTE — TELEPHONE ENCOUNTER
Attempted to call patient to schedule her for her IUD insertion. Phone rang multiple times and then said call could not be completed as dialed.

## 2020-01-30 NOTE — TELEPHONE ENCOUNTER
Pt called to say she just started her cycle last week, she wanted to know if her IUD has arrived so she could have it inserted.

## 2020-02-04 ENCOUNTER — OFFICE VISIT (OUTPATIENT)
Dept: OBSTETRICS AND GYNECOLOGY | Facility: CLINIC | Age: 25
End: 2020-02-04

## 2020-02-04 ENCOUNTER — PROCEDURE VISIT (OUTPATIENT)
Dept: OBSTETRICS AND GYNECOLOGY | Facility: CLINIC | Age: 25
End: 2020-02-04

## 2020-02-04 VITALS
SYSTOLIC BLOOD PRESSURE: 108 MMHG | WEIGHT: 158 LBS | HEIGHT: 62 IN | BODY MASS INDEX: 29.08 KG/M2 | DIASTOLIC BLOOD PRESSURE: 72 MMHG

## 2020-02-04 DIAGNOSIS — Z30.431 IUD CHECK UP: Primary | ICD-10-CM

## 2020-02-04 DIAGNOSIS — Z30.430 ENCOUNTER FOR IUD INSERTION: Primary | ICD-10-CM

## 2020-02-04 PROCEDURE — 58300 INSERT INTRAUTERINE DEVICE: CPT | Performed by: OBSTETRICS & GYNECOLOGY

## 2020-02-04 PROCEDURE — 76830 TRANSVAGINAL US NON-OB: CPT | Performed by: OBSTETRICS & GYNECOLOGY

## 2020-02-04 NOTE — PROGRESS NOTES
IUD Insertion    No LMP recorded.    Date of procedure:  2/4/2020    Risks and benefits discussed? yes  All questions answered? yes  Consents given by The patient  Written consent obtained? yes    Local anesthesia used:  no    Procedure documentation:    After verifying the patient had a low probability of being pregnant and met the criteria for insertion, a sterile speculum has placed and the cervix was cleansed with an antiseptic solution.  Vaginal discharge was scant.  The anterior lip of the cervix was grasped with a tenaculum and the uterine cavity was gently sounded. There was mild difficulty passing the sound through the cervix.  Cervical dilation did not need to be performed prior to placing the IUD.  The uterus was midposition and sounded to 7 cms.  The Mirena was then prepared per the manufacturers instructions.    The Mirena was advanced to a point 2 cms from the fundus and then the arms were released from the sheath.  The device was advanced to the fundus and the device was released fully from the sheath.. The string was cut 2 cms in length.  Bleeding from the cervix was scant.    She tolerated the procedure without any difficulty.    Post procedure instructions: Call if any fever or excessive bleeding or pain.    Correct placement was confirmed via U/S immediately after procedure.    This note was electronically signed.    Axel Espinoza MD    February 4, 2020

## 2020-02-18 ENCOUNTER — TELEPHONE (OUTPATIENT)
Dept: OBSTETRICS AND GYNECOLOGY | Facility: CLINIC | Age: 25
End: 2020-02-18

## 2020-02-18 NOTE — TELEPHONE ENCOUNTER
Patient is calling because she had her Mirene inserted a week ago and is having some brown bleeding and period like cramps and wants to know if this is normal?

## 2020-02-18 NOTE — TELEPHONE ENCOUNTER
That definitely can be normal after inserting an IUD.  If that continues especially into next week we should do another ultrasound to make sure that her uterus is not pushing the IUD out with the cramping.  REY

## 2020-08-02 ENCOUNTER — APPOINTMENT (OUTPATIENT)
Dept: GENERAL RADIOLOGY | Facility: HOSPITAL | Age: 25
End: 2020-08-02

## 2020-08-02 ENCOUNTER — HOSPITAL ENCOUNTER (EMERGENCY)
Facility: HOSPITAL | Age: 25
Discharge: HOME | End: 2020-08-02
Attending: EMERGENCY MEDICINE | Admitting: EMERGENCY MEDICINE

## 2020-08-02 VITALS
DIASTOLIC BLOOD PRESSURE: 68 MMHG | TEMPERATURE: 98.2 F | WEIGHT: 163 LBS | HEART RATE: 84 BPM | HEIGHT: 62 IN | RESPIRATION RATE: 16 BRPM | SYSTOLIC BLOOD PRESSURE: 105 MMHG | OXYGEN SATURATION: 100 % | BODY MASS INDEX: 30 KG/M2

## 2020-08-02 DIAGNOSIS — E87.6 HYPOKALEMIA: ICD-10-CM

## 2020-08-02 DIAGNOSIS — F41.1 ANXIETY STATE: Primary | ICD-10-CM

## 2020-08-02 LAB
ALBUMIN SERPL-MCNC: 4.5 G/DL (ref 3.5–5.2)
ALBUMIN/GLOB SERPL: 1.5 G/DL
ALP SERPL-CCNC: 59 U/L (ref 39–117)
ALT SERPL W P-5'-P-CCNC: 17 U/L (ref 1–33)
AMPHET+METHAMPHET UR QL: NEGATIVE
ANION GAP SERPL CALCULATED.3IONS-SCNC: 13.2 MMOL/L (ref 5–15)
AST SERPL-CCNC: 15 U/L (ref 1–32)
BACTERIA UR QL AUTO: ABNORMAL /HPF
BARBITURATES UR QL SCN: NEGATIVE
BASOPHILS # BLD AUTO: 0.03 10*3/MM3 (ref 0–0.2)
BASOPHILS NFR BLD AUTO: 0.4 % (ref 0–1.5)
BENZODIAZ UR QL SCN: NEGATIVE
BILIRUB SERPL-MCNC: 0.6 MG/DL (ref 0–1.2)
BILIRUB UR QL STRIP: NEGATIVE
BUN SERPL-MCNC: 13 MG/DL (ref 6–20)
BUN/CREAT SERPL: 14.6 (ref 7–25)
CALCIUM SPEC-SCNC: 9.7 MG/DL (ref 8.6–10.5)
CANNABINOIDS SERPL QL: NEGATIVE
CHLORIDE SERPL-SCNC: 95 MMOL/L (ref 98–107)
CLARITY UR: CLEAR
CO2 SERPL-SCNC: 20.8 MMOL/L (ref 22–29)
COCAINE UR QL: NEGATIVE
COLOR UR: YELLOW
CREAT SERPL-MCNC: 0.89 MG/DL (ref 0.57–1)
D DIMER PPP FEU-MCNC: <0.27 MCGFEU/ML (ref 0–0.49)
DEPRECATED RDW RBC AUTO: 43.4 FL (ref 37–54)
EOSINOPHIL # BLD AUTO: 0.11 10*3/MM3 (ref 0–0.4)
EOSINOPHIL NFR BLD AUTO: 1.5 % (ref 0.3–6.2)
ERYTHROCYTE [DISTWIDTH] IN BLOOD BY AUTOMATED COUNT: 12.6 % (ref 12.3–15.4)
ETHANOL BLD-MCNC: <10 MG/DL (ref 0–10)
ETHANOL UR QL: <0.01 %
GFR SERPL CREATININE-BSD FRML MDRD: 77 ML/MIN/1.73
GLOBULIN UR ELPH-MCNC: 3 GM/DL
GLUCOSE SERPL-MCNC: 116 MG/DL (ref 65–99)
GLUCOSE UR STRIP-MCNC: NEGATIVE MG/DL
HCG SERPL QL: NEGATIVE
HCT VFR BLD AUTO: 43.9 % (ref 34–46.6)
HGB BLD-MCNC: 14.3 G/DL (ref 12–15.9)
HGB UR QL STRIP.AUTO: ABNORMAL
HYALINE CASTS UR QL AUTO: ABNORMAL /LPF
IMM GRANULOCYTES # BLD AUTO: 0.02 10*3/MM3 (ref 0–0.05)
IMM GRANULOCYTES NFR BLD AUTO: 0.3 % (ref 0–0.5)
KETONES UR QL STRIP: NEGATIVE
LEUKOCYTE ESTERASE UR QL STRIP.AUTO: NEGATIVE
LIPASE SERPL-CCNC: 35 U/L (ref 13–60)
LYMPHOCYTES # BLD AUTO: 2.81 10*3/MM3 (ref 0.7–3.1)
LYMPHOCYTES NFR BLD AUTO: 39.5 % (ref 19.6–45.3)
MCH RBC QN AUTO: 30.2 PG (ref 26.6–33)
MCHC RBC AUTO-ENTMCNC: 32.6 G/DL (ref 31.5–35.7)
MCV RBC AUTO: 92.6 FL (ref 79–97)
METHADONE UR QL SCN: NEGATIVE
MONOCYTES # BLD AUTO: 0.49 10*3/MM3 (ref 0.1–0.9)
MONOCYTES NFR BLD AUTO: 6.9 % (ref 5–12)
NEUTROPHILS NFR BLD AUTO: 3.66 10*3/MM3 (ref 1.7–7)
NEUTROPHILS NFR BLD AUTO: 51.4 % (ref 42.7–76)
NITRITE UR QL STRIP: NEGATIVE
NRBC BLD AUTO-RTO: 0 /100 WBC (ref 0–0.2)
OPIATES UR QL: NEGATIVE
OXYCODONE UR QL SCN: NEGATIVE
PH UR STRIP.AUTO: 8 [PH] (ref 5–8)
PLATELET # BLD AUTO: 307 10*3/MM3 (ref 140–450)
PMV BLD AUTO: 9.2 FL (ref 6–12)
POTASSIUM SERPL-SCNC: 3.1 MMOL/L (ref 3.5–5.2)
PROT SERPL-MCNC: 7.5 G/DL (ref 6–8.5)
PROT UR QL STRIP: NEGATIVE
RBC # BLD AUTO: 4.74 10*6/MM3 (ref 3.77–5.28)
RBC # UR: ABNORMAL /HPF
REF LAB TEST METHOD: ABNORMAL
SODIUM SERPL-SCNC: 129 MMOL/L (ref 136–145)
SP GR UR STRIP: 1.01 (ref 1–1.03)
SQUAMOUS #/AREA URNS HPF: ABNORMAL /HPF
UROBILINOGEN UR QL STRIP: ABNORMAL
WBC # BLD AUTO: 7.12 10*3/MM3 (ref 3.4–10.8)
WBC UR QL AUTO: ABNORMAL /HPF

## 2020-08-02 PROCEDURE — 81001 URINALYSIS AUTO W/SCOPE: CPT | Performed by: NURSE PRACTITIONER

## 2020-08-02 PROCEDURE — 80307 DRUG TEST PRSMV CHEM ANLYZR: CPT | Performed by: NURSE PRACTITIONER

## 2020-08-02 PROCEDURE — 93005 ELECTROCARDIOGRAM TRACING: CPT | Performed by: NURSE PRACTITIONER

## 2020-08-02 PROCEDURE — 85379 FIBRIN DEGRADATION QUANT: CPT | Performed by: NURSE PRACTITIONER

## 2020-08-02 PROCEDURE — 99284 EMERGENCY DEPT VISIT MOD MDM: CPT

## 2020-08-02 PROCEDURE — 96374 THER/PROPH/DIAG INJ IV PUSH: CPT

## 2020-08-02 PROCEDURE — 93010 ELECTROCARDIOGRAM REPORT: CPT | Performed by: INTERNAL MEDICINE

## 2020-08-02 PROCEDURE — 83690 ASSAY OF LIPASE: CPT | Performed by: NURSE PRACTITIONER

## 2020-08-02 PROCEDURE — 71045 X-RAY EXAM CHEST 1 VIEW: CPT

## 2020-08-02 PROCEDURE — 84703 CHORIONIC GONADOTROPIN ASSAY: CPT | Performed by: NURSE PRACTITIONER

## 2020-08-02 PROCEDURE — 80053 COMPREHEN METABOLIC PANEL: CPT | Performed by: NURSE PRACTITIONER

## 2020-08-02 PROCEDURE — 25010000002 DIPHENHYDRAMINE PER 50 MG: Performed by: NURSE PRACTITIONER

## 2020-08-02 PROCEDURE — 85025 COMPLETE CBC W/AUTO DIFF WBC: CPT | Performed by: NURSE PRACTITIONER

## 2020-08-02 RX ORDER — DIPHENHYDRAMINE HYDROCHLORIDE 50 MG/ML
25 INJECTION INTRAMUSCULAR; INTRAVENOUS ONCE
Status: COMPLETED | OUTPATIENT
Start: 2020-08-02 | End: 2020-08-02

## 2020-08-02 RX ORDER — LORAZEPAM 0.5 MG/1
0.5 TABLET ORAL EVERY 6 HOURS PRN
COMMUNITY
End: 2021-08-11

## 2020-08-02 RX ORDER — POTASSIUM CHLORIDE 750 MG/1
40 CAPSULE, EXTENDED RELEASE ORAL ONCE
Status: COMPLETED | OUTPATIENT
Start: 2020-08-02 | End: 2020-08-02

## 2020-08-02 RX ORDER — HYDROXYZINE PAMOATE 25 MG/1
25 CAPSULE ORAL DAILY
COMMUNITY
End: 2020-11-19

## 2020-08-02 RX ORDER — SODIUM CHLORIDE 0.9 % (FLUSH) 0.9 %
10 SYRINGE (ML) INJECTION AS NEEDED
Status: DISCONTINUED | OUTPATIENT
Start: 2020-08-02 | End: 2020-08-02 | Stop reason: HOSPADM

## 2020-08-02 RX ADMIN — DIPHENHYDRAMINE HYDROCHLORIDE 25 MG: 50 INJECTION, SOLUTION INTRAMUSCULAR; INTRAVENOUS at 18:35

## 2020-08-02 RX ADMIN — SODIUM CHLORIDE 1000 ML: 9 INJECTION, SOLUTION INTRAVENOUS at 19:27

## 2020-08-02 RX ADMIN — POTASSIUM CHLORIDE 40 MEQ: 10 CAPSULE, COATED, EXTENDED RELEASE ORAL at 19:25

## 2020-08-02 NOTE — ED NOTES
Pt states she had pneumonia on July 4th and has had problems with constipation since then.  Pt states for the past week, right after eating she gets anxious.  Pt states it throws her into a panic attack.  Pt states that she has been using Metamucil and rectal suppositories daily for the past week in order to help with constipation.  Pt also stated that over the past 5 days she has had diarrhea.    Pt c/o chest pain that travels up her shoulders and into her back. 10:10.  Pt states it makes it difficult to breathe.  Patient speaks in full paragraphs without pause or hesitation.  Pt also c/o tingling in her fingertips.  Pt states her mother has a history of thyroid issues and had to have it removed.    Pt in a surgical mask.    This nurse in gloves, goggles and a surgical mask.      Rika Begum, RN  08/02/20 [id number]

## 2020-08-02 NOTE — ED TRIAGE NOTES
Last 5 days pt reports tingling in extremities and SOA, pt hsa hx of anxiety. Pt seen at [facility] for same thing on 7/31/2020. Pt states only feels like this after she eats. Pt yelling out during triage. Mask placed on patient in first look triage. Triage staff wearing masks.

## 2020-08-08 ENCOUNTER — HOSPITAL ENCOUNTER (EMERGENCY)
Facility: HOSPITAL | Age: 25
Discharge: HOME | End: 2020-08-08
Attending: EMERGENCY MEDICINE | Admitting: EMERGENCY MEDICINE

## 2020-08-08 VITALS
TEMPERATURE: 97.9 F | RESPIRATION RATE: 16 BRPM | BODY MASS INDEX: 30 KG/M2 | SYSTOLIC BLOOD PRESSURE: 100 MMHG | WEIGHT: 163 LBS | HEIGHT: 62 IN | OXYGEN SATURATION: 100 % | DIASTOLIC BLOOD PRESSURE: 69 MMHG | HEART RATE: 69 BPM

## 2020-08-08 DIAGNOSIS — R00.2 PALPITATIONS: Primary | ICD-10-CM

## 2020-08-08 DIAGNOSIS — Z86.19 HISTORY OF MONONUCLEOSIS: ICD-10-CM

## 2020-08-08 LAB
ALBUMIN SERPL-MCNC: 4.2 G/DL (ref 3.5–5.2)
ALBUMIN/GLOB SERPL: 1.6 G/DL
ALP SERPL-CCNC: 51 U/L (ref 39–117)
ALT SERPL W P-5'-P-CCNC: 18 U/L (ref 1–33)
AMPHET+METHAMPHET UR QL: NEGATIVE
ANION GAP SERPL CALCULATED.3IONS-SCNC: 8.5 MMOL/L (ref 5–15)
AST SERPL-CCNC: 18 U/L (ref 1–32)
B-HCG UR QL: NEGATIVE
BACTERIA UR QL AUTO: NORMAL /HPF
BARBITURATES UR QL SCN: NEGATIVE
BASOPHILS # BLD AUTO: 0.05 10*3/MM3 (ref 0–0.2)
BASOPHILS NFR BLD AUTO: 0.6 % (ref 0–1.5)
BENZODIAZ UR QL SCN: NEGATIVE
BILIRUB SERPL-MCNC: 0.3 MG/DL (ref 0–1.2)
BILIRUB UR QL STRIP: NEGATIVE
BUN SERPL-MCNC: 14 MG/DL (ref 6–20)
BUN/CREAT SERPL: 17.5 (ref 7–25)
CALCIUM SPEC-SCNC: 9.4 MG/DL (ref 8.6–10.5)
CANNABINOIDS SERPL QL: NEGATIVE
CHLORIDE SERPL-SCNC: 104 MMOL/L (ref 98–107)
CLARITY UR: CLEAR
CO2 SERPL-SCNC: 27.5 MMOL/L (ref 22–29)
COCAINE UR QL: NEGATIVE
COLOR UR: YELLOW
CREAT SERPL-MCNC: 0.8 MG/DL (ref 0.57–1)
DEPRECATED RDW RBC AUTO: 40.2 FL (ref 37–54)
EOSINOPHIL # BLD AUTO: 0.17 10*3/MM3 (ref 0–0.4)
EOSINOPHIL NFR BLD AUTO: 2.1 % (ref 0.3–6.2)
ERYTHROCYTE [DISTWIDTH] IN BLOOD BY AUTOMATED COUNT: 12.2 % (ref 12.3–15.4)
GFR SERPL CREATININE-BSD FRML MDRD: 87 ML/MIN/1.73
GLOBULIN UR ELPH-MCNC: 2.6 GM/DL
GLUCOSE SERPL-MCNC: 93 MG/DL (ref 65–99)
GLUCOSE UR STRIP-MCNC: NEGATIVE MG/DL
HCT VFR BLD AUTO: 38.6 % (ref 34–46.6)
HGB BLD-MCNC: 12.9 G/DL (ref 12–15.9)
HGB UR QL STRIP.AUTO: ABNORMAL
HYALINE CASTS UR QL AUTO: NORMAL /LPF
IMM GRANULOCYTES # BLD AUTO: 0.02 10*3/MM3 (ref 0–0.05)
IMM GRANULOCYTES NFR BLD AUTO: 0.2 % (ref 0–0.5)
KETONES UR QL STRIP: NEGATIVE
LEUKOCYTE ESTERASE UR QL STRIP.AUTO: NEGATIVE
LIPASE SERPL-CCNC: 38 U/L (ref 13–60)
LYMPHOCYTES # BLD AUTO: 3.27 10*3/MM3 (ref 0.7–3.1)
LYMPHOCYTES NFR BLD AUTO: 40.7 % (ref 19.6–45.3)
MCH RBC QN AUTO: 29.9 PG (ref 26.6–33)
MCHC RBC AUTO-ENTMCNC: 33.4 G/DL (ref 31.5–35.7)
MCV RBC AUTO: 89.4 FL (ref 79–97)
METHADONE UR QL SCN: NEGATIVE
MONOCYTES # BLD AUTO: 0.77 10*3/MM3 (ref 0.1–0.9)
MONOCYTES NFR BLD AUTO: 9.6 % (ref 5–12)
NEUTROPHILS NFR BLD AUTO: 3.76 10*3/MM3 (ref 1.7–7)
NEUTROPHILS NFR BLD AUTO: 46.8 % (ref 42.7–76)
NITRITE UR QL STRIP: NEGATIVE
NRBC BLD AUTO-RTO: 0 /100 WBC (ref 0–0.2)
OPIATES UR QL: NEGATIVE
OXYCODONE UR QL SCN: NEGATIVE
PH UR STRIP.AUTO: 6.5 [PH] (ref 5–8)
PLATELET # BLD AUTO: 286 10*3/MM3 (ref 140–450)
PMV BLD AUTO: 9.2 FL (ref 6–12)
POTASSIUM SERPL-SCNC: 4 MMOL/L (ref 3.5–5.2)
PROT SERPL-MCNC: 6.8 G/DL (ref 6–8.5)
PROT UR QL STRIP: NEGATIVE
RBC # BLD AUTO: 4.32 10*6/MM3 (ref 3.77–5.28)
RBC # UR: NORMAL /HPF
REF LAB TEST METHOD: NORMAL
SODIUM SERPL-SCNC: 140 MMOL/L (ref 136–145)
SP GR UR STRIP: <1.005 (ref 1–1.03)
SQUAMOUS #/AREA URNS HPF: NORMAL /HPF
T4 FREE SERPL-MCNC: 1.36 NG/DL (ref 0.93–1.7)
TSH SERPL DL<=0.05 MIU/L-ACNC: 3.17 UIU/ML (ref 0.27–4.2)
UROBILINOGEN UR QL STRIP: ABNORMAL
WBC # BLD AUTO: 8.04 10*3/MM3 (ref 3.4–10.8)
WBC UR QL AUTO: NORMAL /HPF

## 2020-08-08 PROCEDURE — 80307 DRUG TEST PRSMV CHEM ANLYZR: CPT | Performed by: EMERGENCY MEDICINE

## 2020-08-08 PROCEDURE — 85025 COMPLETE CBC W/AUTO DIFF WBC: CPT | Performed by: EMERGENCY MEDICINE

## 2020-08-08 PROCEDURE — 84443 ASSAY THYROID STIM HORMONE: CPT | Performed by: EMERGENCY MEDICINE

## 2020-08-08 PROCEDURE — 93010 ELECTROCARDIOGRAM REPORT: CPT | Performed by: INTERNAL MEDICINE

## 2020-08-08 PROCEDURE — 80053 COMPREHEN METABOLIC PANEL: CPT | Performed by: EMERGENCY MEDICINE

## 2020-08-08 PROCEDURE — 84439 ASSAY OF FREE THYROXINE: CPT | Performed by: EMERGENCY MEDICINE

## 2020-08-08 PROCEDURE — 93005 ELECTROCARDIOGRAM TRACING: CPT | Performed by: EMERGENCY MEDICINE

## 2020-08-08 PROCEDURE — 83690 ASSAY OF LIPASE: CPT | Performed by: EMERGENCY MEDICINE

## 2020-08-08 PROCEDURE — 81001 URINALYSIS AUTO W/SCOPE: CPT | Performed by: EMERGENCY MEDICINE

## 2020-08-08 PROCEDURE — 99285 EMERGENCY DEPT VISIT HI MDM: CPT

## 2020-08-08 PROCEDURE — 81025 URINE PREGNANCY TEST: CPT | Performed by: EMERGENCY MEDICINE

## 2020-08-08 RX ORDER — PROPRANOLOL HYDROCHLORIDE 20 MG/1
10 TABLET ORAL ONCE
Status: COMPLETED | OUTPATIENT
Start: 2020-08-08 | End: 2020-08-08

## 2020-08-08 RX ORDER — PROPRANOLOL HYDROCHLORIDE 10 MG/1
10 TABLET ORAL EVERY 12 HOURS PRN
Qty: 60 TABLET | Refills: 0 | Status: SHIPPED | OUTPATIENT
Start: 2020-08-08 | End: 2020-11-19

## 2020-08-08 RX ORDER — SODIUM CHLORIDE 0.9 % (FLUSH) 0.9 %
10 SYRINGE (ML) INJECTION AS NEEDED
Status: DISCONTINUED | OUTPATIENT
Start: 2020-08-08 | End: 2020-08-08 | Stop reason: HOSPADM

## 2020-08-08 RX ADMIN — PROPRANOLOL HYDROCHLORIDE 10 MG: 20 TABLET ORAL at 08:03

## 2020-08-08 NOTE — ED NOTES
Patient wearing mask. This RN wearing all appropriate PPE during patient encounter.        Alejandra Vuong RN  08/08/20 [id number]

## 2020-08-08 NOTE — ED TRIAGE NOTES
Pt from home via EMS for epigastric chest pain and SOA, nausea and abd pain that has been going on for 2 weeks.  Per EMS Pt has NSR. Pt appears in NAD.  Wears no oxygen.

## 2020-08-08 NOTE — ED PROVIDER NOTES
EMERGENCY DEPARTMENT ENCOUNTER    Room Number:    Date seen:  2020  PCP: Higinio Glasgow MD  Historian: Patient      HPI:  Chief Complaint: Generalized weakness, abdominal pain, palpitations  A complete HPI/ROS/PMH/PSH/SH/FH are unobtainable due to: Nothing  Context: Caroline Conley is a 24 y.o. female who presents to the ED c/o multiple complaints.  She reports that for the last 2 weeks she has had intermittent palpitations that seem to be worse after eating.  She is also had difficulty sleeping due to palpitations.  She reports pain in her chest that is worse when she touches her chest wall.  In addition she has had some intermittent abdominal pain that is mostly in the upper abdomen.  She has had frequent diarrhea.  She denies fever or chills.  She also reports intermittent tingling in her hands and feet, occasional dizziness, and general fatigue.  She has been seen at an outside hospital on 2020 and 2020 with reassuring evaluation.  She denies cough, change in sense of smell.  She is had no dysuria.  She has been taking Carafate and pantoprazole with minimal relief.  She also takes hydroxyzine as needed for palpitations with little relief.  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  She also reports irritable mood.            PAST MEDICAL HISTORY  Active Ambulatory Problems     Diagnosis Date Noted   • Herpes 2017   • Arrest of dilation, delivered, current hospitalization 2017     Resolved Ambulatory Problems     Diagnosis Date Noted   • Pregnancy 2017   • Pregnant 2017   • Normal labor 2017     Past Medical History:   Diagnosis Date   • GERD (gastroesophageal reflux disease)    • Hyperemesis gravidarum    • Migraine    • Panic attack    • Seizure (CMS/HCC)          PAST SURGICAL HISTORY  Past Surgical History:   Procedure Laterality Date   • ADENOIDECTOMY     •  SECTION N/A 3/2/2017    Procedure:   SECTION PRIMARY;  Surgeon: Panda Skelton MD;  Location: [facility] LABOR DELIVERY;  Service:    • TONSILLECTOMY     • WISDOM TOOTH EXTRACTION           FAMILY HISTORY  Family History   Problem Relation Age of Onset   • Thyroid disease Mother    • IVON disease Mother          SOCIAL HISTORY  Social History     Socioeconomic History   • Marital status: Single     Spouse name: Not on file   • Number of children: Not on file   • Years of education: Not on file   • Highest education level: Not on file   Tobacco Use   • Smoking status: Never Smoker   • Smokeless tobacco: Never Used   Substance and Sexual Activity   • Alcohol use: Not Currently   • Drug use: No     Comment: Sober x 2 years - used to do meth   • Sexual activity: Yes     Partners: Male         ALLERGIES  Cefdinir and Cephalosporins        REVIEW OF SYSTEMS  Review of Systems   Review of all 14 systems is negative other than stated in the HPI above.      PHYSICAL EXAM  ED Triage Vitals   Temp Heart Rate Resp BP SpO2   20 0644 20 0644 20 0644 20 0644 20 0644   97.9 °F (36.6 °C) 96 18 133/77 99 %      Temp src Heart Rate Source Patient Position BP Location FiO2 (%)   20 0644 20 0652 -- -- --   Oral Monitor            GENERAL: Awake and alert, no acute distress  HENT: nares patent, oropharynx clear  EYES: no scleral icterus, pupils 3 mm reactive bilaterally  CV: regular rhythm, normal rate, no murmur  RESPIRATORY: normal effort, lungs clear to auscultation bilaterally  ABDOMEN: soft, nondistended, nontender throughout  MUSCULOSKELETAL: no deformity  NEURO: alert, moves all extremities, follows commands.  Normal steady gait.  Mild tremor noted in bilateral upper extremities.  SKIN: warm, dry    Vital signs and nursing notes reviewed.          LAB RESULTS  Recent Results (from the past 24 hour(s))   Comprehensive Metabolic Panel    Collection Time: 20  7:34 AM   Result Value Ref Range    Glucose 93 65 - 99  mg/dL    BUN 14 6 - 20 mg/dL    Creatinine 0.80 0.57 - 1.00 mg/dL    Sodium 140 136 - 145 mmol/L    Potassium 4.0 3.5 - 5.2 mmol/L    Chloride 104 98 - 107 mmol/L    CO2 27.5 22.0 - 29.0 mmol/L    Calcium 9.4 8.6 - 10.5 mg/dL    Total Protein 6.8 6.0 - 8.5 g/dL    Albumin 4.20 3.50 - 5.20 g/dL    ALT (SGPT) 18 1 - 33 U/L    AST (SGOT) 18 1 - 32 U/L    Alkaline Phosphatase 51 39 - 117 U/L    Total Bilirubin 0.3 0.0 - 1.2 mg/dL    eGFR Non African Amer 87 >60 mL/min/1.73    Globulin 2.6 gm/dL    A/G Ratio 1.6 g/dL    BUN/Creatinine Ratio 17.5 7.0 - 25.0    Anion Gap 8.5 5.0 - 15.0 mmol/L   TSH    Collection Time: 08/08/20  7:34 AM   Result Value Ref Range    TSH 3.170 0.270 - 4.200 uIU/mL   T4, Free    Collection Time: 08/08/20  7:34 AM   Result Value Ref Range    Free T4 1.36 0.93 - 1.70 ng/dL   Pregnancy, Urine - Urine, Clean Catch    Collection Time: 08/08/20  7:34 AM   Result Value Ref Range    HCG, Urine QL Negative Negative   Urinalysis With Microscopic If Indicated (No Culture) - Urine, Clean Catch    Collection Time: 08/08/20  7:34 AM   Result Value Ref Range    Color, UA Yellow Yellow, Straw    Appearance, UA Clear Clear    pH, UA 6.5 5.0 - 8.0    Specific Gravity, UA <1.005 (L) 1.005 - 1.030    Glucose, UA Negative Negative    Ketones, UA Negative Negative    Bilirubin, UA Negative Negative    Blood, UA Trace (A) Negative    Protein, UA Negative Negative    Leuk Esterase, UA Negative Negative    Nitrite, UA Negative Negative    Urobilinogen, UA 0.2 E.U./dL 0.2 - 1.0 E.U./dL   Lipase    Collection Time: 08/08/20  7:34 AM   Result Value Ref Range    Lipase 38 13 - 60 U/L   CBC Auto Differential    Collection Time: 08/08/20  7:34 AM   Result Value Ref Range    WBC 8.04 3.40 - 10.80 10*3/mm3    RBC 4.32 3.77 - 5.28 10*6/mm3    Hemoglobin 12.9 12.0 - 15.9 g/dL    Hematocrit 38.6 34.0 - 46.6 %    MCV 89.4 79.0 - 97.0 fL    MCH 29.9 26.6 - 33.0 pg    MCHC 33.4 31.5 - 35.7 g/dL    RDW 12.2 (L) 12.3 - 15.4 %     RDW-SD 40.2 37.0 - 54.0 fl    MPV 9.2 6.0 - 12.0 fL    Platelets 286 140 - 450 10*3/mm3    Neutrophil % 46.8 42.7 - 76.0 %    Lymphocyte % 40.7 19.6 - 45.3 %    Monocyte % 9.6 5.0 - 12.0 %    Eosinophil % 2.1 0.3 - 6.2 %    Basophil % 0.6 0.0 - 1.5 %    Immature Grans % 0.2 0.0 - 0.5 %    Neutrophils, Absolute 3.76 1.70 - 7.00 10*3/mm3    Lymphocytes, Absolute 3.27 (H) 0.70 - 3.10 10*3/mm3    Monocytes, Absolute 0.77 0.10 - 0.90 10*3/mm3    Eosinophils, Absolute 0.17 0.00 - 0.40 10*3/mm3    Basophils, Absolute 0.05 0.00 - 0.20 10*3/mm3    Immature Grans, Absolute 0.02 0.00 - 0.05 10*3/mm3    nRBC 0.0 0.0 - 0.2 /100 WBC   Urine Drug Screen - Urine, Clean Catch    Collection Time: 08/08/20  7:34 AM   Result Value Ref Range    Amphet/Methamphet, Screen Negative Negative    Barbiturates Screen, Urine Negative Negative    Benzodiazepine Screen, Urine Negative Negative    Cocaine Screen, Urine Negative Negative    Opiate Screen Negative Negative    THC, Screen, Urine Negative Negative    Methadone Screen, Urine Negative Negative    Oxycodone Screen, Urine Negative Negative   Urinalysis, Microscopic Only - Urine, Clean Catch    Collection Time: 08/08/20  7:34 AM   Result Value Ref Range    RBC, UA 0-2 None Seen, 0-2 /HPF    WBC, UA 0-2 None Seen, 0-2 /HPF    Bacteria, UA None Seen None Seen /HPF    Squamous Epithelial Cells, UA 0-2 None Seen, 0-2 /HPF    Hyaline Casts, UA None Seen None Seen /LPF    Methodology Manual Light Microscopy        Ordered the above labs and reviewed the results.              PROCEDURES  Procedures            MEDICATIONS GIVEN IN ER  Medications   propranolol (INDERAL) tablet 10 mg (10 mg Oral Given 8/8/20 0803)                   MEDICAL DECISION MAKING, PROGRESS, and CONSULTS    ED Course as of Aug 08 1504   Sat Aug 08, 2020   0729 Medical records were reviewed from patient's visit at [facility] on 8/6/2020.  Chest x-ray was clear at that time.  Her labs were reassuring including  normal TSH.  She did have an EBV antibody panel obtained which suggested either prior infection or possible reactivation of prior infection.  Her Monospot was negative.    [JR]   0734 24-year-old female presents with multiple complaints.  I have ordered labs including CBC, CMP, lipase, TFTs, urinalysis, urine pregnancy, urine drug screen for further evaluation of her symptoms.  She reports frequent palpitations although she appears to be in sinus rhythm with a rate of 80 to 90s on the cardiac monitor.  She does have a fine tremor.  I have ordered propranolol 10 mg p.o. to see if this may help her symptoms while she is in the emergency department.    [JR]   0822 EKG          EKG time: 756  Rhythm/Rate: Sinus rhythm 89  P waves and ME: Normal  QRS, axis: Normal axis  ST and T waves: Slight T wave inversion in V3    Interpreted Contemporaneously by me, independently viewed  Similar compared to prior 8/2/20          [JR]   0823 WBC: 8.04 [JR]   0823 Hemoglobin: 12.9 [JR]   0823 HCG, Urine QL: Negative [JR]   0823 Free T4: 1.36 [JR]   0823 TSH Baseline: 3.170 [JR]   0823 Lipase: 38 [JR]   0823 Creatinine: 0.80 [JR]   0823 ALT (SGPT): 18 [JR]   0823 AST (SGOT): 18 [JR]   0823 Nitrite, UA: Negative [JR]   0909 Patient reassessed.  She reports some symptomatic relief after oral propranolol here.  I will prescribe this to take as needed for her palpitations.  I also explained that the EBV antibody panel obtained and would not of been suggest either reactivation or prior mononucleosis, and this could be contributing to her current symptoms.  She was advised to follow-up with her primary care provider.  Return precautions were discussed.    [JR]      ED Course User Index  [JR] Pavan Emmanuel MD       [facility]       I wore a surgical mask, gloves, and eye protection during this patient encounter.  Patient also wearing a surgical mask.  Hand hygeine performed before and after seeing the patient.    DIAGNOSIS  Final diagnoses:    Palpitations   History of mononucleosis         DISPOSITION  DISCHARGE    Patient discharged in stable condition.    Reviewed implications of results, diagnosis, meds, responsibility to follow up, warning signs and symptoms of possible worsening, potential complications and reasons to return to ER.    Patient/Family voiced understanding of above instructions.    Discussed plan for discharge, as there is no emergent indication for admission. Patient referred to primary care provider for BP management due to today's BP. Pt/family is agreeable and understands need for follow up and repeat testing.  Pt is aware that discharge does not mean that nothing is wrong but it indicates no emergency is present that requires admission and they must continue care with follow-up as given below or physician of their choice.     FOLLOW-UP  Higinio Glasgow MD  [address]   [place] [address] [phone]    Schedule an appointment as soon as possible for a visit            Medication List      New Prescriptions    propranolol 10 MG tablet  Commonly known as:  INDERAL  Take 1 tablet by mouth Every 12 (Twelve) Hours As Needed (palpitations).                      Latest Documented Vital Signs:  As of 15:04  BP- 100/69 HR- 69 Temp- 97.9 °F (36.6 °C) (Oral) O2 sat- 100%        --    Please note that portions of this were completed with a voice recognition program.          Pavan Emmanuel MD  08/08/20 [id number]

## 2020-08-10 ENCOUNTER — INPATIENT HOSPITAL (INPATIENT)
Dept: URBAN - METROPOLITAN AREA HOSPITAL 107 | Facility: HOSPITAL | Age: 25
End: 2020-08-10
Payer: COMMERCIAL

## 2020-08-10 DIAGNOSIS — R42 DIZZINESS AND GIDDINESS: ICD-10-CM

## 2020-08-10 DIAGNOSIS — F19.10 OTHER PSYCHOACTIVE SUBSTANCE ABUSE, UNCOMPLICATED: ICD-10-CM

## 2020-08-10 DIAGNOSIS — R00.2 PALPITATIONS: ICD-10-CM

## 2020-08-10 DIAGNOSIS — R06.00 DYSPNEA, UNSPECIFIED: ICD-10-CM

## 2020-08-10 DIAGNOSIS — F10.10 ALCOHOL ABUSE, UNCOMPLICATED: ICD-10-CM

## 2020-08-10 DIAGNOSIS — K59.00 CONSTIPATION, UNSPECIFIED: ICD-10-CM

## 2020-08-10 PROCEDURE — 99222 1ST HOSP IP/OBS MODERATE 55: CPT | Performed by: INTERNAL MEDICINE

## 2020-08-11 ENCOUNTER — INPATIENT HOSPITAL (INPATIENT)
Dept: URBAN - METROPOLITAN AREA HOSPITAL 107 | Facility: HOSPITAL | Age: 25
End: 2020-08-11
Payer: COMMERCIAL

## 2020-08-11 DIAGNOSIS — K59.00 CONSTIPATION, UNSPECIFIED: ICD-10-CM

## 2020-08-11 DIAGNOSIS — F41.9 ANXIETY DISORDER, UNSPECIFIED: ICD-10-CM

## 2020-08-11 DIAGNOSIS — R00.2 PALPITATIONS: ICD-10-CM

## 2020-08-11 DIAGNOSIS — K92.1 MELENA: ICD-10-CM

## 2020-08-11 DIAGNOSIS — F19.10 OTHER PSYCHOACTIVE SUBSTANCE ABUSE, UNCOMPLICATED: ICD-10-CM

## 2020-08-11 DIAGNOSIS — R11.10 VOMITING, UNSPECIFIED: ICD-10-CM

## 2020-08-11 DIAGNOSIS — R14.0 ABDOMINAL DISTENSION (GASEOUS): ICD-10-CM

## 2020-08-11 DIAGNOSIS — R11.0 NAUSEA: ICD-10-CM

## 2020-08-11 PROCEDURE — 99232 SBSQ HOSP IP/OBS MODERATE 35: CPT | Performed by: PHYSICIAN ASSISTANT

## 2020-08-12 PROCEDURE — 99232 SBSQ HOSP IP/OBS MODERATE 35: CPT | Performed by: PHYSICIAN ASSISTANT

## 2020-08-13 ENCOUNTER — INPATIENT HOSPITAL (INPATIENT)
Dept: URBAN - METROPOLITAN AREA HOSPITAL 107 | Facility: HOSPITAL | Age: 25
End: 2020-08-13
Payer: COMMERCIAL

## 2020-08-13 DIAGNOSIS — G40.89 OTHER SEIZURES: ICD-10-CM

## 2020-08-13 DIAGNOSIS — R10.84 GENERALIZED ABDOMINAL PAIN: ICD-10-CM

## 2020-08-13 PROCEDURE — 43235 EGD DIAGNOSTIC BRUSH WASH: CPT

## 2020-08-13 PROCEDURE — 45378 DIAGNOSTIC COLONOSCOPY: CPT

## 2020-11-19 ENCOUNTER — OFFICE VISIT (OUTPATIENT)
Dept: OBSTETRICS AND GYNECOLOGY | Facility: CLINIC | Age: 25
End: 2020-11-19

## 2020-11-19 VITALS
DIASTOLIC BLOOD PRESSURE: 70 MMHG | WEIGHT: 182 LBS | BODY MASS INDEX: 33.49 KG/M2 | SYSTOLIC BLOOD PRESSURE: 102 MMHG | HEIGHT: 62 IN

## 2020-11-19 DIAGNOSIS — Z01.411 ENCOUNTER FOR GYNECOLOGICAL EXAMINATION WITH ABNORMAL FINDING: Primary | ICD-10-CM

## 2020-11-19 DIAGNOSIS — N89.8 VAGINAL DISCHARGE: ICD-10-CM

## 2020-11-19 DIAGNOSIS — N94.10 FEMALE DYSPAREUNIA: ICD-10-CM

## 2020-11-19 DIAGNOSIS — R35.0 URINARY FREQUENCY: ICD-10-CM

## 2020-11-19 PROCEDURE — 99395 PREV VISIT EST AGE 18-39: CPT | Performed by: OBSTETRICS & GYNECOLOGY

## 2020-11-19 RX ORDER — HYDROXYZINE HYDROCHLORIDE 25 MG/1
TABLET, FILM COATED ORAL
COMMUNITY
Start: 2020-10-03

## 2020-11-19 RX ORDER — POTASSIUM CHLORIDE 600 MG/1
8 TABLET, FILM COATED, EXTENDED RELEASE ORAL DAILY
COMMUNITY
Start: 2020-09-25

## 2020-11-19 RX ORDER — AMITRIPTYLINE HYDROCHLORIDE 25 MG/1
25 TABLET, FILM COATED ORAL EVERY EVENING
COMMUNITY
Start: 2020-10-03 | End: 2021-04-28

## 2020-11-19 NOTE — PROGRESS NOTES
Subjective    Chief Complaint   Patient presents with   • Gynecologic Exam     AE, Pt states pain with intercourse, frequent urination, white discharge,itching       History of Present Illness    Caroline Conley is a 25 y.o. female who presents for annual exam.  Non-smoker.  Patient has vaginal discharge and UTI symptoms.  She also has dyspareunia.  Dyspareunia sounds like it is from abrasion from possibly needing lubricant.  May also be due to vaginitis.  Has had a 20 pound weight gain this year but did have TFTs drawn this morning by her PCP.  Has Mirena IUD in place.    Obstetric History:  OB History        1    Para   1    Term   1            AB        Living   1       SAB        TAB        Ectopic        Molar        Multiple   0    Live Births   1               Menstrual History:     No LMP recorded. (Menstrual status: Other).       Past Medical History:   Diagnosis Date   • GERD (gastroesophageal reflux disease)    • Herpes     last outbreak , no current outbrek, FOB does not know   • Hyperemesis gravidarum    • Migraine    • Panic attack    • Seizure (CMS/[facility])      Family History   Problem Relation Age of Onset   • Thyroid disease Mother    • IVON disease Mother      Social History     Tobacco Use   Smoking Status Never Smoker   Smokeless Tobacco Never Used         The following portions of the patient's history were reviewed and updated as appropriate: allergies, current medications, past family history, past medical history, past social history, past surgical history and problem list.    Review of Systems   Constitutional: Negative.  Negative for fever and unexpected weight change.   HENT: Negative.    Respiratory: Negative for shortness of breath and wheezing.    Cardiovascular: Negative for chest pain, palpitations and leg swelling.   Gastrointestinal: Negative for abdominal pain, anal bleeding and blood in stool.   Genitourinary: Positive for dyspareunia, frequency, vaginal discharge  and vaginal pain. Negative for dysuria, pelvic pain, urgency and vaginal bleeding.   Skin: Negative.    Neurological: Negative.    Hematological: Negative.  Negative for adenopathy.   Psychiatric/Behavioral: Negative.  Negative for dysphoric mood. The patient is not nervous/anxious.             Objective   Physical Exam  Exam conducted with a chaperone present.   Constitutional:       Appearance: Normal appearance. She is well-developed.   HENT:      Head: Normocephalic.   Neck:      Thyroid: No thyromegaly.      Trachea: Trachea normal. No tracheal deviation.   Cardiovascular:      Rate and Rhythm: Normal rate and regular rhythm.      Heart sounds: Normal heart sounds. No murmur.   Pulmonary:      Effort: Pulmonary effort is normal.      Breath sounds: Normal breath sounds.   Chest:      Breasts:         Right: Normal. No mass, nipple discharge or tenderness.         Left: Normal. No mass, nipple discharge or tenderness.   Abdominal:      Palpations: Abdomen is soft. There is no mass.      Tenderness: There is no abdominal tenderness.      Hernia: No hernia is present.   Genitourinary:     General: Normal vulva.      Labia:         Right: No tenderness or lesion.         Left: No tenderness or lesion.       Urethra: No prolapse or urethral lesion.      Vagina: Normal. No vaginal discharge or lesions.      Cervix: No cervical motion tenderness.      Uterus: Not enlarged and not tender.       Adnexa:         Right: No mass or tenderness.          Left: No mass or tenderness.        Rectum: Normal. No external hemorrhoid or internal hemorrhoid. Normal anal tone.      Comments: External genitalia normal.  White vaginal discharge noted.  IUD string seen.  Lymphadenopathy:      Cervical: No cervical adenopathy.      Upper Body:      Right upper body: No axillary adenopathy.      Left upper body: No axillary adenopathy.   Skin:     General: Skin is warm and dry.      Findings: No rash.   Neurological:      Mental Status:  "She is alert and oriented to person, place, and time.   Psychiatric:         Behavior: Behavior normal.         /70   Ht 157.5 cm (62\")   Wt 82.6 kg (182 lb)   BMI 33.29 kg/m²     Assessment/Plan   Diagnoses and all orders for this visit:    1. Encounter for gynecological examination with abnormal finding (Primary)  -     IGP,rfx Aptima HPV All Pth    2. Vaginal discharge  -     NuSwab VG+ - Swab, Vagina    3. Urinary frequency  -     Urine Culture - Urine, Urine, Clean Catch  -     Urinalysis With Microscopic - Urine, Clean Catch  -     Comprehensive Metabolic Panel  -     Hemoglobin A1C With EMG    4. Female dyspareunia        Impression and plan.   Patient to call next week for results of labs.  Counseled about diet and exercise.  Counseled about using lubricant with intercourse.  An additional 15 minutes was spent today face-to-face counseling discussing patient's symptoms and outlining evaluation for her urinary frequency and vaginal discharge with dyspareunia.  If urinary symptoms become persistent despite negative urine culture may benefit from urology consultation and evaluation for possible interstitial cystitis.             "

## 2020-11-20 LAB
ALBUMIN SERPL-MCNC: 4.2 G/DL (ref 3.5–5.2)
ALBUMIN/GLOB SERPL: 1.8 G/DL
ALP SERPL-CCNC: 63 U/L (ref 39–117)
ALT SERPL-CCNC: 26 U/L (ref 1–33)
APPEARANCE UR: CLEAR
AST SERPL-CCNC: 17 U/L (ref 1–32)
BACTERIA #/AREA URNS HPF: ABNORMAL /HPF
BILIRUB SERPL-MCNC: 0.2 MG/DL (ref 0–1.2)
BILIRUB UR QL STRIP: NEGATIVE
BUN SERPL-MCNC: 13 MG/DL (ref 6–20)
BUN/CREAT SERPL: 17.1 (ref 7–25)
CALCIUM SERPL-MCNC: 8.9 MG/DL (ref 8.6–10.5)
CHLORIDE SERPL-SCNC: 101 MMOL/L (ref 98–107)
CO2 SERPL-SCNC: 27 MMOL/L (ref 22–29)
COLOR UR: YELLOW
CREAT SERPL-MCNC: 0.76 MG/DL (ref 0.57–1)
EPI CELLS #/AREA URNS HPF: ABNORMAL /HPF
EST. AVERAGE GLUCOSE BLD GHB EST-MCNC: 105.41 MG/DL
GLOBULIN SER CALC-MCNC: 2.3 GM/DL
GLUCOSE SERPL-MCNC: 85 MG/DL (ref 65–99)
GLUCOSE UR QL: NEGATIVE
HBA1C MFR BLD: 5.3 % (ref 4.8–5.6)
HGB UR QL STRIP: NEGATIVE
KETONES UR QL STRIP: NEGATIVE
LEUKOCYTE ESTERASE UR QL STRIP: NEGATIVE
NITRITE UR QL STRIP: NEGATIVE
PH UR STRIP: 5.5 [PH] (ref 5–8)
POTASSIUM SERPL-SCNC: 4.6 MMOL/L (ref 3.5–5.2)
PROT SERPL-MCNC: 6.5 G/DL (ref 6–8.5)
PROT UR QL STRIP: NEGATIVE
RBC #/AREA URNS HPF: ABNORMAL /HPF
SODIUM SERPL-SCNC: 136 MMOL/L (ref 136–145)
SP GR UR: 1.01 (ref 1–1.03)
UROBILINOGEN UR STRIP-MCNC: NORMAL MG/DL
WBC #/AREA URNS HPF: ABNORMAL /HPF

## 2020-11-21 LAB
BACTERIA UR CULT: NORMAL
BACTERIA UR CULT: NORMAL

## 2020-11-23 ENCOUNTER — TELEPHONE (OUTPATIENT)
Dept: OBSTETRICS AND GYNECOLOGY | Facility: CLINIC | Age: 25
End: 2020-11-23

## 2020-11-23 LAB
CONV .: NORMAL
CYTOLOGIST CVX/VAG CYTO: NORMAL
CYTOLOGY CVX/VAG DOC CYTO: NORMAL
CYTOLOGY CVX/VAG DOC THIN PREP: NORMAL
DX ICD CODE: NORMAL
HIV 1 & 2 AB SER-IMP: NORMAL
OTHER STN SPEC: NORMAL
STAT OF ADQ CVX/VAG CYTO-IMP: NORMAL

## 2020-11-23 NOTE — TELEPHONE ENCOUNTER
----- Message from Axel Espinoza MD sent at 11/23/2020  8:27 AM EST -----  Please tell pt her urine culture was neg for infection and her tests for diabetes were normal. Vag culture not back yet REY

## 2020-11-25 ENCOUNTER — TELEPHONE (OUTPATIENT)
Dept: OBSTETRICS AND GYNECOLOGY | Facility: CLINIC | Age: 25
End: 2020-11-25

## 2020-11-25 LAB
A VAGINAE DNA VAG QL NAA+PROBE: ABNORMAL SCORE
BVAB2 DNA VAG QL NAA+PROBE: ABNORMAL SCORE
C ALBICANS DNA VAG QL NAA+PROBE: POSITIVE
C GLABRATA DNA VAG QL NAA+PROBE: NEGATIVE
C TRACH DNA VAG QL NAA+PROBE: NEGATIVE
MEGA1 DNA VAG QL NAA+PROBE: ABNORMAL SCORE
N GONORRHOEA DNA VAG QL NAA+PROBE: NEGATIVE
T VAGINALIS DNA VAG QL NAA+PROBE: NEGATIVE

## 2020-11-25 NOTE — TELEPHONE ENCOUNTER
----- Message from Axel Espinoza MD sent at 11/25/2020  8:40 AM EST -----  Please tell pt vag culture showed yeast so I sent Terazol to her pharmacy  Thanks Rio

## 2021-02-24 ENCOUNTER — OFFICE VISIT (OUTPATIENT)
Dept: OBSTETRICS AND GYNECOLOGY | Facility: CLINIC | Age: 26
End: 2021-02-24

## 2021-02-24 VITALS
SYSTOLIC BLOOD PRESSURE: 110 MMHG | WEIGHT: 182 LBS | BODY MASS INDEX: 33.49 KG/M2 | HEIGHT: 62 IN | DIASTOLIC BLOOD PRESSURE: 70 MMHG

## 2021-02-24 DIAGNOSIS — R39.9 UTI SYMPTOMS: Primary | ICD-10-CM

## 2021-02-24 DIAGNOSIS — N89.8 VAGINAL DISCHARGE: ICD-10-CM

## 2021-02-24 DIAGNOSIS — F41.0 PANIC ATTACKS: ICD-10-CM

## 2021-02-24 PROCEDURE — 99213 OFFICE O/P EST LOW 20 MIN: CPT | Performed by: OBSTETRICS & GYNECOLOGY

## 2021-02-24 RX ORDER — AMITRIPTYLINE HYDROCHLORIDE 25 MG/1
25 TABLET, FILM COATED ORAL NIGHTLY
Qty: 30 TABLET | Refills: 2 | Status: SHIPPED | OUTPATIENT
Start: 2021-02-24

## 2021-02-24 NOTE — PROGRESS NOTES
"Subjective    Chief Complaint   Patient presents with   • Follow-up     Pt states incontinence, previously burning with urination, odor, white discharge        History of Present Illness    Caroline Conley is a 25 y.o. female who presents for recent vaginal irritation with intercourse and vaginal discharge.  Seen in ER last night and prescribed Macrobid for possible UTI but never given UA results.  We will repeat today.  Has not started her antibiotics yet.  Is between PCP physicians and does need a refill on Elavil 25 mg a day for panic attacks and I told her I would do that today for her.    Obstetric History:  OB History        1    Para   1    Term   1            AB        Living   1       SAB        TAB        Ectopic        Molar        Multiple   0    Live Births   1               Menstrual History:     No LMP recorded. (Menstrual status: Other).       Past Medical History:   Diagnosis Date   • GERD (gastroesophageal reflux disease)    • Herpes     last outbreak , no current outbrek, FOB does not know   • Hyperemesis gravidarum    • Migraine    • Panic attack    • Seizure (CMS/HCC)      Family History   Problem Relation Age of Onset   • Thyroid disease Mother    • IVON disease Mother        The following portions of the patient's history were reviewed and updated as appropriate: allergies, current medications, past medical history, past surgical history and problem list.    Review of Systems  As per HPI       Objective   Physical Exam  Vagina with white discharge.  Vaginal culture performed.  Cervix without lesion.  Uterus nontender and normal size.  Adnexa negative.  /70   Ht 157.5 cm (62\")   Wt 82.6 kg (182 lb)   BMI 33.29 kg/m²     Assessment/Plan   Diagnoses and all orders for this visit:    1. UTI symptoms (Primary)  -     Urinalysis With Microscopic - Urine, Clean Catch  -     Urine Culture - Urine, Urine, Clean Catch    2. Vaginal discharge  -     NuSwab VG+ - Swab, " Vagina    3. Panic attacks    Other orders  -     amitriptyline (ELAVIL) 25 MG tablet; Take 1 tablet by mouth Every Night.  Dispense: 30 tablet; Refill: 2        Impression and plan.  Since not able to get urine culture results today will repeat urine culture and UA and have done a full vaginal culture as well.  Patient will call 1 week for results but will start her Macrobid tonight.    25-minute visit today of which 20 minutes was face-to-face counseling concerning the history of her issues to date, her exam yesterday in the emergency room, and possible reasons for her vaginal discharge.

## 2021-02-26 ENCOUNTER — TELEPHONE (OUTPATIENT)
Dept: OBSTETRICS AND GYNECOLOGY | Facility: CLINIC | Age: 26
End: 2021-02-26

## 2021-02-26 LAB
A VAGINAE DNA VAG QL NAA+PROBE: NORMAL SCORE
APPEARANCE UR: CLEAR
BACTERIA #/AREA URNS HPF: ABNORMAL /[HPF]
BACTERIA UR CULT: ABNORMAL
BACTERIA UR CULT: ABNORMAL
BILIRUB UR QL STRIP: NEGATIVE
BVAB2 DNA VAG QL NAA+PROBE: NORMAL SCORE
C ALBICANS DNA VAG QL NAA+PROBE: NEGATIVE
C GLABRATA DNA VAG QL NAA+PROBE: NEGATIVE
C TRACH DNA VAG QL NAA+PROBE: NEGATIVE
COLOR UR: YELLOW
EPI CELLS #/AREA URNS HPF: ABNORMAL /HPF (ref 0–10)
GLUCOSE UR QL: NEGATIVE
HGB UR QL STRIP: ABNORMAL
KETONES UR QL STRIP: NEGATIVE
LEUKOCYTE ESTERASE UR QL STRIP: ABNORMAL
MEGA1 DNA VAG QL NAA+PROBE: NORMAL SCORE
MICRO URNS: ABNORMAL
MUCOUS THREADS URNS QL MICRO: PRESENT
N GONORRHOEA DNA VAG QL NAA+PROBE: NEGATIVE
NITRITE UR QL STRIP: POSITIVE
OTHER ANTIBIOTIC SUSC ISLT: ABNORMAL
PH UR STRIP: 6.5 [PH] (ref 5–7.5)
PROT UR QL STRIP: NEGATIVE
RBC #/AREA URNS HPF: ABNORMAL /HPF (ref 0–2)
SP GR UR: 1.02 (ref 1–1.03)
T VAGINALIS DNA VAG QL NAA+PROBE: NEGATIVE
UROBILINOGEN UR STRIP-MCNC: 0.2 MG/DL (ref 0.2–1)
WBC #/AREA URNS HPF: ABNORMAL /HPF (ref 0–5)

## 2021-02-26 NOTE — TELEPHONE ENCOUNTER
Attempted to reach pt, line kept ringing then said call could not be completed. Will try again later. SM

## 2021-02-26 NOTE — TELEPHONE ENCOUNTER
----- Message from Axel Espinoza MD sent at 2/26/2021  8:55 AM EST -----  Please tell vag culture neg but awaiting urine results till next week. Thanks Isael

## 2021-03-02 ENCOUNTER — TELEPHONE (OUTPATIENT)
Dept: OBSTETRICS AND GYNECOLOGY | Facility: CLINIC | Age: 26
End: 2021-03-02

## 2021-03-02 RX ORDER — NITROFURANTOIN 25; 75 MG/1; MG/1
100 CAPSULE ORAL 2 TIMES DAILY
Qty: 14 CAPSULE | Refills: 0 | Status: SHIPPED | OUTPATIENT
Start: 2021-03-02 | End: 2021-03-09

## 2021-03-02 NOTE — TELEPHONE ENCOUNTER
----- Message from Axel Espinoza MD sent at 3/2/2021 10:33 AM EST -----  Please tell patient that she does have a UTI sensitive to Macrobid so I have sent that to her pharmacy.  REY

## 2021-04-28 ENCOUNTER — OFFICE VISIT (OUTPATIENT)
Dept: OBSTETRICS AND GYNECOLOGY | Facility: CLINIC | Age: 26
End: 2021-04-28

## 2021-04-28 VITALS
DIASTOLIC BLOOD PRESSURE: 70 MMHG | WEIGHT: 182 LBS | HEIGHT: 62 IN | SYSTOLIC BLOOD PRESSURE: 100 MMHG | BODY MASS INDEX: 33.49 KG/M2

## 2021-04-28 DIAGNOSIS — N89.8 VAGINAL DISCHARGE: ICD-10-CM

## 2021-04-28 DIAGNOSIS — R35.0 URINARY FREQUENCY: Primary | ICD-10-CM

## 2021-04-28 PROCEDURE — 99213 OFFICE O/P EST LOW 20 MIN: CPT | Performed by: OBSTETRICS & GYNECOLOGY

## 2021-04-28 NOTE — PROGRESS NOTES
"Subjective    Chief Complaint   Patient presents with   • Follow-up     Pt states still having urinary frequency, white discharge and odor       History of Present Illness    Caroline Conley is a 25 y.o. female who presents for urinary frequency and vaginal discharge.  Patient treated a couple months ago for UTI with Macrobid and had a negative vaginal culture at that time.  She states she is urinating all the time now and we will check a UA and urine culture.  Her history is a little consistent with diabetes insipidus, and patient is being admitted this weekend for next week due to a recent seizure and getting a full work-up and will mention this to her physicians.  She has never had seizures before and does run low potassium.  She also feels like she has a vaginal infection with a really bad odor.  She does not feel it is yeast.    Obstetric History:  OB History        1    Para   1    Term   1            AB        Living   1       SAB        TAB        Ectopic        Molar        Multiple   0    Live Births   1               Menstrual History:     No LMP recorded. (Menstrual status: Other).       Past Medical History:   Diagnosis Date   • GERD (gastroesophageal reflux disease)    • Herpes     last outbreak , no current outbrek, FOB does not know   • Hyperemesis gravidarum    • Migraine    • Panic attack    • Seizure (CMS/[facility])      Family History   Problem Relation Age of Onset   • Thyroid disease Mother    • IVON disease Mother        The following portions of the patient's history were reviewed and updated as appropriate: allergies, current medications, past medical history, past surgical history and problem list.    Review of Systems  As per HPI       Objective   Physical Exam  Vagina with white discharge consistent with possible bacterial vaginosis.  Vaginal culture performed.  Bimanual exam negative.  /70   Ht 157.5 cm (62\")   Wt 82.6 kg (182 lb)   BMI 33.29 kg/m²     Assessment/Plan "   Diagnoses and all orders for this visit:    1. Urinary frequency (Primary)  -     Urine Culture - Urine, Urine, Clean Catch  -     Urinalysis With Microscopic - Urine, Clean Catch    2. Vaginal discharge  -     NuSwab VG+ - Swab, Vagina        Pression and plan.  Vaginal culture and urine culture performed and patient will call next week for results.  She will mention possible diabetes insipidus work-up with her physicians who are admitting her to [facility] on Sunday for new onset of seizure disorder.

## 2021-04-30 LAB
A VAGINAE DNA VAG QL NAA+PROBE: ABNORMAL SCORE
APPEARANCE UR: ABNORMAL
BACTERIA #/AREA URNS HPF: ABNORMAL /[HPF]
BACTERIA UR CULT: NORMAL
BACTERIA UR CULT: NORMAL
BILIRUB UR QL STRIP: NEGATIVE
BVAB2 DNA VAG QL NAA+PROBE: ABNORMAL SCORE
C ALBICANS DNA VAG QL NAA+PROBE: NEGATIVE
C GLABRATA DNA VAG QL NAA+PROBE: NEGATIVE
C TRACH DNA VAG QL NAA+PROBE: NEGATIVE
CASTS URNS QL MICRO: ABNORMAL /LPF
COLOR UR: YELLOW
EPI CELLS #/AREA URNS HPF: >10 /HPF (ref 0–10)
GLUCOSE UR QL: NEGATIVE
HGB UR QL STRIP: ABNORMAL
KETONES UR QL STRIP: NEGATIVE
LEUKOCYTE ESTERASE UR QL STRIP: NEGATIVE
MEGA1 DNA VAG QL NAA+PROBE: ABNORMAL SCORE
MICRO URNS: ABNORMAL
N GONORRHOEA DNA VAG QL NAA+PROBE: NEGATIVE
NITRITE UR QL STRIP: NEGATIVE
PH UR STRIP: 6 [PH] (ref 5–7.5)
PROT UR QL STRIP: NEGATIVE
RBC #/AREA URNS HPF: ABNORMAL /HPF (ref 0–2)
SP GR UR: 1.02 (ref 1–1.03)
T VAGINALIS DNA VAG QL NAA+PROBE: NEGATIVE
UROBILINOGEN UR STRIP-MCNC: 0.2 MG/DL (ref 0.2–1)
WBC #/AREA URNS HPF: ABNORMAL /HPF (ref 0–5)

## 2021-05-03 ENCOUNTER — TELEPHONE (OUTPATIENT)
Dept: OBSTETRICS AND GYNECOLOGY | Facility: CLINIC | Age: 26
End: 2021-05-03

## 2021-05-03 RX ORDER — METRONIDAZOLE 500 MG/1
500 TABLET ORAL 3 TIMES DAILY
Qty: 15 TABLET | Refills: 0 | Status: SHIPPED | OUTPATIENT
Start: 2021-05-03 | End: 2021-05-08

## 2021-05-03 NOTE — TELEPHONE ENCOUNTER
Call could not be completed will try again. Pt did state at last visit she would be in the hospital all week so may not answer till Friday. SM

## 2021-05-03 NOTE — TELEPHONE ENCOUNTER
----- Message from Axel Espinoza MD sent at 5/3/2021  1:06 PM EDT -----  Please tell patient that her vaginal culture came back definite for bacterial vaginosis only.  I have sent Flagyl to her pharmacy.  She cannot use alcohol with it.  Thank you.  REY

## 2021-05-07 NOTE — TELEPHONE ENCOUNTER
Attempted to reach pt twice call could not be completed either time. Will attempt to reach again, if unable to will send letter. MUNIR

## 2021-06-02 ENCOUNTER — TELEPHONE (OUTPATIENT)
Dept: OBSTETRICS AND GYNECOLOGY | Facility: CLINIC | Age: 26
End: 2021-06-02

## 2021-06-09 ENCOUNTER — OFFICE VISIT (OUTPATIENT)
Dept: OBSTETRICS AND GYNECOLOGY | Facility: CLINIC | Age: 26
End: 2021-06-09

## 2021-06-09 VITALS
BODY MASS INDEX: 33.49 KG/M2 | DIASTOLIC BLOOD PRESSURE: 80 MMHG | WEIGHT: 182 LBS | HEIGHT: 62 IN | SYSTOLIC BLOOD PRESSURE: 110 MMHG

## 2021-06-09 DIAGNOSIS — R10.2 VAGINAL PAIN: ICD-10-CM

## 2021-06-09 DIAGNOSIS — N89.8 VAGINAL DISCHARGE: Primary | ICD-10-CM

## 2021-06-09 PROCEDURE — 99213 OFFICE O/P EST LOW 20 MIN: CPT | Performed by: OBSTETRICS & GYNECOLOGY

## 2021-06-09 RX ORDER — PROPRANOLOL HYDROCHLORIDE 10 MG/1
10 TABLET ORAL 2 TIMES DAILY
COMMUNITY
End: 2023-01-18

## 2021-06-09 NOTE — PROGRESS NOTES
"Subjective    Chief Complaint   Patient presents with   • Follow-up     pt states having itching, burning with urination, odor       History of Present Illness    Caroline Conley is a 25 y.o. female who presents for intense vaginal itching with white discharge.  Patient has been treated for BV and UTIs in the recent past.  She has been on antibiotics for such and the itching started after.  History very consistent with yeast.    Obstetric History:  OB History        1    Para   1    Term   1            AB        Living   1       SAB        TAB        Ectopic        Molar        Multiple   0    Live Births   1               Menstrual History:     No LMP recorded. (Menstrual status: Other).       Past Medical History:   Diagnosis Date   • GERD (gastroesophageal reflux disease)    • Herpes     last outbreak , no current outbrek, FOB does not know   • Hyperemesis gravidarum    • Migraine    • Panic attack    • Seizure (CMS/HCC)      Family History   Problem Relation Age of Onset   • Thyroid disease Mother    • IVON disease Mother        The following portions of the patient's history were reviewed and updated as appropriate: allergies, current medications, past family history, past medical history, past social history, past surgical history and problem list.    Review of Systems   Positive for vaginal itching and discharge    Objective   Physical Exam  Labia extremely red in fact the whole vulvar area is red and irritated.  No ulceration.  Vagina with white discharge.  Vaginal culture performed.  Bimanual exam negative.  /80   Ht 157.5 cm (62\")   Wt 82.6 kg (182 lb)   BMI 33.29 kg/m²     Assessment/Plan   Diagnoses and all orders for this visit:    1. Vaginal discharge (Primary)  -     NuSwab VG+ - Swab, Vagina  -     Urinalysis With Microscopic - Urine, Clean Catch  -     Urine Culture - Urine, Urine, Clean Catch    2. Vaginal pain  -     NuSwab VG+ - Swab, Vagina  -     Urinalysis With " Microscopic - Urine, Clean Catch  -     Urine Culture - Urine, Urine, Clean Catch    Other orders  -     terconazole (Terazol 7) 0.4 % vaginal cream; Insert 1 applicator into the vagina Every Night for 7 days.  Dispense: 45 g; Refill: 0        Call 1 week for results.  Will treat for yeast as clinical picture very consistent with this and patient is having extreme discomfort.

## 2021-06-13 LAB
A VAGINAE DNA VAG QL NAA+PROBE: ABNORMAL SCORE
APPEARANCE UR: CLEAR
BACTERIA #/AREA URNS HPF: ABNORMAL /HPF
BACTERIA UR CULT: ABNORMAL
BACTERIA UR CULT: ABNORMAL
BILIRUB UR QL STRIP: NEGATIVE
BVAB2 DNA VAG QL NAA+PROBE: ABNORMAL SCORE
C ALBICANS DNA VAG QL NAA+PROBE: POSITIVE
C GLABRATA DNA VAG QL NAA+PROBE: NEGATIVE
C TRACH DNA VAG QL NAA+PROBE: NEGATIVE
CASTS URNS MICRO: ABNORMAL
COLOR UR: YELLOW
EPI CELLS #/AREA URNS HPF: ABNORMAL /HPF
GLUCOSE UR QL: NEGATIVE
HGB UR QL STRIP: ABNORMAL
KETONES UR QL STRIP: NEGATIVE
LEUKOCYTE ESTERASE UR QL STRIP: ABNORMAL
MEGA1 DNA VAG QL NAA+PROBE: ABNORMAL SCORE
N GONORRHOEA DNA VAG QL NAA+PROBE: NEGATIVE
NITRITE UR QL STRIP: NEGATIVE
OTHER ANTIBIOTIC SUSC ISLT: ABNORMAL
PH UR STRIP: 6 [PH] (ref 5–8)
PROT UR QL STRIP: NEGATIVE
RBC #/AREA URNS HPF: ABNORMAL /HPF
SP GR UR: 1.02 (ref 1–1.03)
T VAGINALIS DNA VAG QL NAA+PROBE: NEGATIVE
UROBILINOGEN UR STRIP-MCNC: ABNORMAL MG/DL
WBC #/AREA URNS HPF: ABNORMAL /HPF

## 2021-06-14 ENCOUNTER — TELEPHONE (OUTPATIENT)
Dept: OBSTETRICS AND GYNECOLOGY | Facility: CLINIC | Age: 26
End: 2021-06-14

## 2021-06-14 RX ORDER — NITROFURANTOIN 25; 75 MG/1; MG/1
100 CAPSULE ORAL 2 TIMES DAILY
Qty: 14 CAPSULE | Refills: 0 | Status: SHIPPED | OUTPATIENT
Start: 2021-06-14 | End: 2021-06-21

## 2021-06-14 NOTE — TELEPHONE ENCOUNTER
Please tell patient that her urine culture came back with a definite UTI and I have sent Macrobid to her pharmacy.  Her vaginal culture showed definite yeast and I have already treated her with Terazol 7 so that was the right choice.  It also had a question of BV but nothing definite so I probably would not treat that at this time.  Thank you.  REY

## 2021-08-04 ENCOUNTER — HOSPITAL ENCOUNTER (EMERGENCY)
Facility: HOSPITAL | Age: 26
Discharge: HOME | End: 2021-08-05
Attending: EMERGENCY MEDICINE | Admitting: EMERGENCY MEDICINE

## 2021-08-04 DIAGNOSIS — R53.83 FATIGUE, UNSPECIFIED TYPE: ICD-10-CM

## 2021-08-04 DIAGNOSIS — R20.2 PARESTHESIA: Primary | ICD-10-CM

## 2021-08-04 LAB
ALBUMIN SERPL-MCNC: 4.1 G/DL (ref 3.5–5.2)
ALBUMIN/GLOB SERPL: 1.4 G/DL
ALP SERPL-CCNC: 67 U/L (ref 39–117)
ALT SERPL W P-5'-P-CCNC: 30 U/L (ref 1–33)
ANION GAP SERPL CALCULATED.3IONS-SCNC: 9.9 MMOL/L (ref 5–15)
AST SERPL-CCNC: 20 U/L (ref 1–32)
BASOPHILS # BLD AUTO: 0.03 10*3/MM3 (ref 0–0.2)
BASOPHILS NFR BLD AUTO: 0.3 % (ref 0–1.5)
BILIRUB SERPL-MCNC: 0.6 MG/DL (ref 0–1.2)
BUN SERPL-MCNC: 14 MG/DL (ref 6–20)
BUN/CREAT SERPL: 18.9 (ref 7–25)
CALCIUM SPEC-SCNC: 9.2 MG/DL (ref 8.6–10.5)
CHLORIDE SERPL-SCNC: 103 MMOL/L (ref 98–107)
CO2 SERPL-SCNC: 24.1 MMOL/L (ref 22–29)
CREAT SERPL-MCNC: 0.74 MG/DL (ref 0.57–1)
D DIMER PPP FEU-MCNC: 0.33 MCGFEU/ML (ref 0–0.49)
DEPRECATED RDW RBC AUTO: 41.4 FL (ref 37–54)
EOSINOPHIL # BLD AUTO: 0.11 10*3/MM3 (ref 0–0.4)
EOSINOPHIL NFR BLD AUTO: 1.1 % (ref 0.3–6.2)
ERYTHROCYTE [DISTWIDTH] IN BLOOD BY AUTOMATED COUNT: 12.8 % (ref 12.3–15.4)
ETHANOL BLD-MCNC: <10 MG/DL (ref 0–10)
ETHANOL UR QL: <0.01 %
GFR SERPL CREATININE-BSD FRML MDRD: 95 ML/MIN/1.73
GLOBULIN UR ELPH-MCNC: 3 GM/DL
GLUCOSE SERPL-MCNC: 98 MG/DL (ref 65–99)
HCT VFR BLD AUTO: 40.2 % (ref 34–46.6)
HGB BLD-MCNC: 13.6 G/DL (ref 12–15.9)
IMM GRANULOCYTES # BLD AUTO: 0.03 10*3/MM3 (ref 0–0.05)
IMM GRANULOCYTES NFR BLD AUTO: 0.3 % (ref 0–0.5)
LYMPHOCYTES # BLD AUTO: 1.72 10*3/MM3 (ref 0.7–3.1)
LYMPHOCYTES NFR BLD AUTO: 17.7 % (ref 19.6–45.3)
MAGNESIUM SERPL-MCNC: 2 MG/DL (ref 1.6–2.6)
MCH RBC QN AUTO: 30.2 PG (ref 26.6–33)
MCHC RBC AUTO-ENTMCNC: 33.8 G/DL (ref 31.5–35.7)
MCV RBC AUTO: 89.3 FL (ref 79–97)
MONOCYTES # BLD AUTO: 0.58 10*3/MM3 (ref 0.1–0.9)
MONOCYTES NFR BLD AUTO: 6 % (ref 5–12)
NEUTROPHILS NFR BLD AUTO: 7.26 10*3/MM3 (ref 1.7–7)
NEUTROPHILS NFR BLD AUTO: 74.6 % (ref 42.7–76)
NRBC BLD AUTO-RTO: 0 /100 WBC (ref 0–0.2)
PLATELET # BLD AUTO: 318 10*3/MM3 (ref 140–450)
PMV BLD AUTO: 9.3 FL (ref 6–12)
POTASSIUM SERPL-SCNC: 4 MMOL/L (ref 3.5–5.2)
PROT SERPL-MCNC: 7.1 G/DL (ref 6–8.5)
RBC # BLD AUTO: 4.5 10*6/MM3 (ref 3.77–5.28)
SODIUM SERPL-SCNC: 137 MMOL/L (ref 136–145)
TROPONIN T SERPL-MCNC: <0.01 NG/ML (ref 0–0.03)
WBC # BLD AUTO: 9.73 10*3/MM3 (ref 3.4–10.8)

## 2021-08-04 PROCEDURE — 80307 DRUG TEST PRSMV CHEM ANLYZR: CPT | Performed by: EMERGENCY MEDICINE

## 2021-08-04 PROCEDURE — 80053 COMPREHEN METABOLIC PANEL: CPT

## 2021-08-04 PROCEDURE — 82077 ASSAY SPEC XCP UR&BREATH IA: CPT

## 2021-08-04 PROCEDURE — 83735 ASSAY OF MAGNESIUM: CPT | Performed by: EMERGENCY MEDICINE

## 2021-08-04 PROCEDURE — 85025 COMPLETE CBC W/AUTO DIFF WBC: CPT

## 2021-08-04 PROCEDURE — 96365 THER/PROPH/DIAG IV INF INIT: CPT

## 2021-08-04 PROCEDURE — 36415 COLL VENOUS BLD VENIPUNCTURE: CPT

## 2021-08-04 PROCEDURE — 84703 CHORIONIC GONADOTROPIN ASSAY: CPT | Performed by: EMERGENCY MEDICINE

## 2021-08-04 PROCEDURE — 84484 ASSAY OF TROPONIN QUANT: CPT | Performed by: EMERGENCY MEDICINE

## 2021-08-04 PROCEDURE — 85379 FIBRIN DEGRADATION QUANT: CPT | Performed by: EMERGENCY MEDICINE

## 2021-08-04 PROCEDURE — 99283 EMERGENCY DEPT VISIT LOW MDM: CPT

## 2021-08-04 PROCEDURE — 93005 ELECTROCARDIOGRAM TRACING: CPT | Performed by: EMERGENCY MEDICINE

## 2021-08-04 PROCEDURE — 93010 ELECTROCARDIOGRAM REPORT: CPT | Performed by: INTERNAL MEDICINE

## 2021-08-04 PROCEDURE — 25010000002 THIAMINE PER 100 MG: Performed by: EMERGENCY MEDICINE

## 2021-08-04 RX ORDER — SODIUM CHLORIDE 0.9 % (FLUSH) 0.9 %
10 SYRINGE (ML) INJECTION AS NEEDED
Status: DISCONTINUED | OUTPATIENT
Start: 2021-08-04 | End: 2021-08-05 | Stop reason: HOSPADM

## 2021-08-04 RX ADMIN — FOLIC ACID 1000 ML/HR: 5 INJECTION, SOLUTION INTRAMUSCULAR; INTRAVENOUS; SUBCUTANEOUS at 23:44

## 2021-08-05 ENCOUNTER — APPOINTMENT (OUTPATIENT)
Dept: GENERAL RADIOLOGY | Facility: HOSPITAL | Age: 26
End: 2021-08-05

## 2021-08-05 VITALS
TEMPERATURE: 97.1 F | OXYGEN SATURATION: 100 % | DIASTOLIC BLOOD PRESSURE: 73 MMHG | SYSTOLIC BLOOD PRESSURE: 106 MMHG | HEIGHT: 62 IN | RESPIRATION RATE: 18 BRPM | WEIGHT: 185 LBS | HEART RATE: 64 BPM | BODY MASS INDEX: 34.04 KG/M2

## 2021-08-05 LAB
AMPHET+METHAMPHET UR QL: NEGATIVE
BARBITURATES UR QL SCN: NEGATIVE
BENZODIAZ UR QL SCN: NEGATIVE
CANNABINOIDS SERPL QL: NEGATIVE
COCAINE UR QL: NEGATIVE
HCG SERPL QL: NEGATIVE
METHADONE UR QL SCN: NEGATIVE
OPIATES UR QL: NEGATIVE
OXYCODONE UR QL SCN: NEGATIVE
QT INTERVAL: 414 MS

## 2021-08-05 PROCEDURE — 71045 X-RAY EXAM CHEST 1 VIEW: CPT

## 2021-08-05 NOTE — ED NOTES
.RN wearing all appropriate PPE during entire encounter with patient.          Behringer, Catherine, PAIGE  08/04/21 [id number]

## 2021-08-05 NOTE — ED PROVIDER NOTES
EMERGENCY DEPARTMENT ENCOUNTER    Room Number:  10/10  Date of encounter:  2021  PCP: Higinio Glasgow MD (Inactive)  Historian: Patient      HPI:  Chief Complaint: Paresthesia, fatigue, intermittent chest pain and shortness of breath  A complete HPI/ROS/PMH/PSH/SH/FH are unobtainable due to: None    Context: Caroline Conley is a 25 y.o. female who presents to the ED c/o relapse after 2 years of sobriety on Saturday.  States she blacked out the evening and then went to the hospital she was given Zofran.  Since then she has reported intermittent paresthesias to her limbs, increased sleepiness, myalgias, shortness of breath and chest pain.  Patient was seen yesterday by her primary care doctor.  Patient states that she has not drunk any further alcohol.  States some intermittent shakiness and nausea neither which is present currently.      PAST MEDICAL HISTORY  Active Ambulatory Problems     Diagnosis Date Noted   • Herpes 2017   • Arrest of dilation, delivered, current hospitalization 2017     Resolved Ambulatory Problems     Diagnosis Date Noted   • Pregnancy 2017   • Pregnant 2017   • Normal labor 2017     Past Medical History:   Diagnosis Date   • GERD (gastroesophageal reflux disease)    • Hyperemesis gravidarum    • Migraine    • Panic attack    • Seizure (CMS/HCC)          PAST SURGICAL HISTORY  Past Surgical History:   Procedure Laterality Date   • ADENOIDECTOMY     •  SECTION N/A 3/2/2017    Procedure:  SECTION PRIMARY;  Surgeon: Panda Skelton MD;  Location: [facility] LABOR DELIVERY;  Service:    • COLONOSCOPY     • TONSILLECTOMY     • WISDOM TOOTH EXTRACTION           FAMILY HISTORY  Family History   Problem Relation Age of Onset   • Thyroid disease Mother    • IVON disease Mother          SOCIAL HISTORY  Social History     Socioeconomic History   • Marital status: Single     Spouse name: Not on file   • Number of children: Not on file   • Years of  education: Not on file   • Highest education level: Not on file   Tobacco Use   • Smoking status: Never Smoker   • Smokeless tobacco: Never Used   Substance and Sexual Activity   • Alcohol use: Not Currently   • Drug use: No     Comment: 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       PHYSICAL EXAM    I have reviewed the triage vital signs and nursing notes.    ED Triage Vitals   Temp Heart Rate Resp BP SpO2   08/04/21 2007 08/04/21 2007 08/04/21 2043 08/04/21 2007 08/04/21 2007   97.1 °F (36.2 °C) 83 16 113/76 99 %      Temp src Heart Rate Source Patient Position BP Location FiO2 (%)   -- 08/04/21 2251 08/04/21 2251 08/04/21 2251 --    Monitor Lying Left arm        Physical Exam  GENERAL: not distressed  HENT: nares patent  EYES: no scleral icterus  CV: regular rhythm, regular rate  RESPIRATORY: normal effort, clear to auscultation bilaterally  ABDOMEN: soft, nontender, nondistended  MUSCULOSKELETAL: no deformity  NEURO: alert, moves all extremities, follows commands  SKIN: warm, dry        LAB RESULTS  Recent Results (from the past 24 hour(s))   Ethanol    Collection Time: 08/04/21  9:11 PM    Specimen: Blood   Result Value Ref Range    Ethanol <10 0 - 10 mg/dL    Ethanol % <0.010 %   Comprehensive Metabolic Panel    Collection Time: 08/04/21  9:11 PM    Specimen: Blood   Result Value Ref Range    Glucose 98 65 - 99 mg/dL    BUN 14 6 - 20 mg/dL    Creatinine 0.74 0.57 - 1.00 mg/dL    Sodium 137 136 - 145 mmol/L    Potassium 4.0 3.5 - 5.2 mmol/L    Chloride 103 98 - 107 mmol/L    CO2 24.1 22.0 - 29.0 mmol/L    Calcium 9.2 8.6 - 10.5 mg/dL    Total Protein 7.1 6.0 - 8.5 g/dL    Albumin 4.10 3.50 - 5.20 g/dL    ALT (SGPT) 30 1 - 33 U/L    AST (SGOT) 20 1 - 32 U/L    Alkaline Phosphatase 67 39 - 117 U/L     Total Bilirubin 0.6 0.0 - 1.2 mg/dL    eGFR Non African Amer 95 >60 mL/min/1.73    Globulin 3.0 gm/dL    A/G Ratio 1.4 g/dL    BUN/Creatinine Ratio 18.9 7.0 - 25.0    Anion Gap 9.9 5.0 - 15.0 mmol/L   CBC Auto Differential    Collection Time: 08/04/21  9:11 PM    Specimen: Blood   Result Value Ref Range    WBC 9.73 3.40 - 10.80 10*3/mm3    RBC 4.50 3.77 - 5.28 10*6/mm3    Hemoglobin 13.6 12.0 - 15.9 g/dL    Hematocrit 40.2 34.0 - 46.6 %    MCV 89.3 79.0 - 97.0 fL    MCH 30.2 26.6 - 33.0 pg    MCHC 33.8 31.5 - 35.7 g/dL    RDW 12.8 12.3 - 15.4 %    RDW-SD 41.4 37.0 - 54.0 fl    MPV 9.3 6.0 - 12.0 fL    Platelets 318 140 - 450 10*3/mm3    Neutrophil % 74.6 42.7 - 76.0 %    Lymphocyte % 17.7 (L) 19.6 - 45.3 %    Monocyte % 6.0 5.0 - 12.0 %    Eosinophil % 1.1 0.3 - 6.2 %    Basophil % 0.3 0.0 - 1.5 %    Immature Grans % 0.3 0.0 - 0.5 %    Neutrophils, Absolute 7.26 (H) 1.70 - 7.00 10*3/mm3    Lymphocytes, Absolute 1.72 0.70 - 3.10 10*3/mm3    Monocytes, Absolute 0.58 0.10 - 0.90 10*3/mm3    Eosinophils, Absolute 0.11 0.00 - 0.40 10*3/mm3    Basophils, Absolute 0.03 0.00 - 0.20 10*3/mm3    Immature Grans, Absolute 0.03 0.00 - 0.05 10*3/mm3    nRBC 0.0 0.0 - 0.2 /100 WBC   Troponin    Collection Time: 08/04/21  9:11 PM    Specimen: Blood   Result Value Ref Range    Troponin T <0.010 0.000 - 0.030 ng/mL   Magnesium    Collection Time: 08/04/21  9:11 PM    Specimen: Blood   Result Value Ref Range    Magnesium 2.0 1.6 - 2.6 mg/dL   Urine Drug Screen - Urine, Clean Catch    Collection Time: 08/04/21 11:18 PM    Specimen: Urine, Clean Catch   Result Value Ref Range    Amphet/Methamphet, Screen Negative Negative    Barbiturates Screen, Urine Negative Negative    Benzodiazepine Screen, Urine Negative Negative    Cocaine Screen, Urine Negative Negative    Opiate Screen Negative Negative    THC, Screen, Urine Negative Negative    Methadone Screen, Urine Negative Negative    Oxycodone Screen, Urine Negative Negative   hCG, Serum,  Qualitative    Collection Time: 08/04/21 11:23 PM    Specimen: Blood   Result Value Ref Range    HCG Qualitative Negative Negative   D-dimer, Quantitative    Collection Time: 08/04/21 11:23 PM    Specimen: Blood   Result Value Ref Range    D-Dimer, Quantitative 0.33 0.00 - 0.49 MCGFEU/mL   ECG 12 Lead    Collection Time: 08/04/21 11:31 PM   Result Value Ref Range    QT Interval 414 ms       Ordered the above labs and independently reviewed the results.        RADIOLOGY  XR Chest 1 View    Result Date: 8/5/2021  Patient: ARMANDO HARRISON  Time Out: 01:00 Exam(s): FILM CXR 1 VIEW EXAM:   XR Chest, 1 View CLINICAL HISTORY:    Reason for exam: soa. TECHNIQUE:   Frontal view of the chest. COMPARISON:   08 02 20 FINDINGS:   Lungs:  Unremarkable.  No consolidation.   Pleural space:  Unremarkable.  No pneumothorax.   Heart:  Unremarkable.  No cardiomegaly.   Mediastinum:  Unremarkable.   Bones joints:  Unremarkable. IMPRESSION:       Normal chest x-ray.     Electronically signed by Panda Meredith DO on 08-05-21 at 0100      I ordered the above noted radiological studies. Reviewed by me and discussed with radiologist.  See dictation for official radiology interpretation.      PROCEDURES    Procedures      MEDICATIONS GIVEN IN ER    Medications   thiamine (B-1) 100 mg, folic acid 1 mg in sodium chloride 0.9 % 1,000 mL infusion (0 mL/hr Intravenous Stopped 8/5/21 0144)         PROGRESS, DATA ANALYSIS, CONSULTS, AND MEDICAL DECISION MAKING    All labs have been independently reviewed by me.  All radiology studies have been reviewed by me and discussed with radiologist dictating the report.   EKG's independently viewed and interpreted by me.  Discussion below represents my analysis of pertinent findings related to patient's condition, differential diagnosis, treatment plan and final disposition.        ED Course as of Aug 05 0712   Wed Aug 04, 2021   2333 EKG          EKG time: 2331  Rhythm/Rate: Sinus rhythm rate 70  P waves  and IL: Normal  QRS, axis: Normal  ST and T waves: Nonspecific    Interpreted Contemporaneously by me, independently viewed  No significant changed compared to prior EKG from 8/8/2020      [TJ]   Thu Aug 05, 2021   0123 Reevaluation: Patient is feeling better.  Will discharge home.    [TJ]      ED Course User Index  [TJ] Hipolito Goodman MD           PPE: Both the patient and I wore a surgical mask throughout the entire patient encounter. I wore protective goggles.     AS OF 07:12 EDT VITALS:    BP - 106/73  HR - 64  TEMP - 97.1 °F (36.2 °C)  O2 SATS - 100%        DIAGNOSIS  Final diagnoses:   Paresthesia   Fatigue, unspecified type         DISPOSITION  Discharge           Hipolito Goodman MD  08/05/21 [id number]

## 2021-08-05 NOTE — ED NOTES
RN called pharmacy to check on status of banana bag, states they are working on it currently and will call when it is ready.      Shameka Morejon RN  08/04/21 [id number]

## 2021-08-06 ENCOUNTER — TELEPHONE (OUTPATIENT)
Dept: OBSTETRICS AND GYNECOLOGY | Facility: CLINIC | Age: 26
End: 2021-08-06

## 2021-08-11 ENCOUNTER — OFFICE VISIT (OUTPATIENT)
Dept: OBSTETRICS AND GYNECOLOGY | Facility: CLINIC | Age: 26
End: 2021-08-11

## 2021-08-11 VITALS
DIASTOLIC BLOOD PRESSURE: 64 MMHG | BODY MASS INDEX: 34.04 KG/M2 | WEIGHT: 185 LBS | HEIGHT: 62 IN | SYSTOLIC BLOOD PRESSURE: 102 MMHG

## 2021-08-11 DIAGNOSIS — R68.82 DECREASED LIBIDO: ICD-10-CM

## 2021-08-11 DIAGNOSIS — N89.8 VAGINAL DISCHARGE: Primary | ICD-10-CM

## 2021-08-11 PROCEDURE — 99213 OFFICE O/P EST LOW 20 MIN: CPT | Performed by: OBSTETRICS & GYNECOLOGY

## 2021-08-11 RX ORDER — ONDANSETRON 4 MG/1
4 TABLET, ORALLY DISINTEGRATING ORAL
COMMUNITY
Start: 2021-08-02

## 2021-08-11 RX ORDER — PANTOPRAZOLE SODIUM 40 MG/1
40 TABLET, DELAYED RELEASE ORAL DAILY
COMMUNITY
Start: 2021-08-03 | End: 2022-08-03

## 2021-08-11 NOTE — PROGRESS NOTES
"Subjective    Chief Complaint   Patient presents with   • Follow-up     Pt states odor and discharge       History of Present Illness    Caroline Conley is a 25 y.o. female who presents for vaginal discharge and odor.  Patient had a vaginal culture several months ago at which time she had yeast but was indeterminate for BV.  Last Pap 2020 - neg.    patient has been tested for diabetes and negative.  She also has had problems with orgasm most of her life and would like testosterone levels drawn.  She has a Mirena IUD .     Obstetric History:  OB History        1    Para   1    Term   1            AB        Living   1       SAB        TAB        Ectopic        Molar        Multiple   0    Live Births   1               Menstrual History:     No LMP recorded. (Menstrual status: Other).       Past Medical History:   Diagnosis Date   • GERD (gastroesophageal reflux disease)    • Herpes     last outbreak , no current outbrek, FOB does not know   • Hyperemesis gravidarum    • Migraine    • Panic attack    • Seizure (CMS/HCC)      Family History   Problem Relation Age of Onset   • Thyroid disease Mother    • IVON disease Mother        The following portions of the patient's history were reviewed and updated as appropriate: allergies, current medications, past medical history, past social history, past surgical history and problem list.    Review of Systems  As per HPI       Objective   Physical Exam  Mild white vaginal discharge consistent with possible BV.  Vaginal culture performed.  Cervix without lesion.  Uterus normal size and shape.  Adnexa negative.  /64   Ht 157.5 cm (62\")   Wt 83.9 kg (185 lb)   BMI 33.84 kg/m²     Assessment/Plan   Diagnoses and all orders for this visit:    1. Vaginal discharge (Primary)  -     NuSwab VG+ - Swab, Vagina    2. Decreased libido  -     Testosterone, Free, Total        Patient to call 1 week for results.  Discussed possible therapy for arousal " issues.  Checking testosterone levels.

## 2021-08-13 LAB
A VAGINAE DNA VAG QL NAA+PROBE: ABNORMAL SCORE
BVAB2 DNA VAG QL NAA+PROBE: ABNORMAL SCORE
C ALBICANS DNA VAG QL NAA+PROBE: NEGATIVE
C GLABRATA DNA VAG QL NAA+PROBE: NEGATIVE
C TRACH DNA VAG QL NAA+PROBE: NEGATIVE
MEGA1 DNA VAG QL NAA+PROBE: ABNORMAL SCORE
N GONORRHOEA DNA VAG QL NAA+PROBE: NEGATIVE
T VAGINALIS DNA VAG QL NAA+PROBE: NEGATIVE

## 2021-08-15 LAB
TESTOST FREE SERPL-MCNC: 2 PG/ML (ref 0–4.2)
TESTOST SERPL-MCNC: 30 NG/DL (ref 13–71)

## 2021-08-18 ENCOUNTER — TELEPHONE (OUTPATIENT)
Dept: OBSTETRICS AND GYNECOLOGY | Facility: CLINIC | Age: 26
End: 2021-08-18

## 2021-08-18 RX ORDER — METRONIDAZOLE 500 MG/1
500 TABLET ORAL 3 TIMES DAILY
Qty: 15 TABLET | Refills: 0 | Status: SHIPPED | OUTPATIENT
Start: 2021-08-18 | End: 2021-08-23

## 2021-08-18 NOTE — TELEPHONE ENCOUNTER
----- Message from Axel Espinoza MD sent at 8/18/2021  4:40 PM EDT -----  Please tell patient that her vaginal culture came back definitely with bacterial vaginosis so I have sent Flagyl to her pharmacy.  She should not take alcohol with it.  Her testosterone levels are totally normal.  She has had libido issues and if she is desiring counseling we could research that for her but she would need to know that very often it is not covered by insurance.  Just let us know.  Thank you.  REY   Pt back from ct. Vss. No signs of distress.

## 2021-08-18 NOTE — TELEPHONE ENCOUNTER
Pt aware of culture and not to drink alcohol with medication. Aware of normal testosterone level but would like to proceed with counseling. SM

## 2021-08-18 NOTE — PROGRESS NOTES
Please tell patient that her vaginal culture came back definitely with bacterial vaginosis so I have sent Flagyl to her pharmacy.  She should not take alcohol with it.  Her testosterone levels are totally normal.  She has had libido issues and if she is desiring counseling we could research that for her but she would need to know that very often it is not covered by insurance.  Just let us know.  Thank you.  REY

## 2021-11-12 ENCOUNTER — TELEPHONE (OUTPATIENT)
Dept: OBSTETRICS AND GYNECOLOGY | Facility: CLINIC | Age: 26
End: 2021-11-12

## 2021-11-24 ENCOUNTER — OFFICE VISIT (OUTPATIENT)
Dept: OBSTETRICS AND GYNECOLOGY | Facility: CLINIC | Age: 26
End: 2021-11-24

## 2021-11-24 VITALS
BODY MASS INDEX: 34.04 KG/M2 | DIASTOLIC BLOOD PRESSURE: 64 MMHG | WEIGHT: 185 LBS | HEIGHT: 62 IN | SYSTOLIC BLOOD PRESSURE: 98 MMHG

## 2021-11-24 DIAGNOSIS — K62.5 RECTAL BLEEDING: ICD-10-CM

## 2021-11-24 DIAGNOSIS — R31.9 HEMATURIA, UNSPECIFIED TYPE: Primary | ICD-10-CM

## 2021-11-24 DIAGNOSIS — R10.2 PELVIC PAIN: ICD-10-CM

## 2021-11-24 PROCEDURE — 99213 OFFICE O/P EST LOW 20 MIN: CPT | Performed by: OBSTETRICS & GYNECOLOGY

## 2021-11-24 RX ORDER — POTASSIUM CHLORIDE 1500 MG/1
TABLET, FILM COATED, EXTENDED RELEASE ORAL
COMMUNITY
Start: 2021-11-17

## 2021-11-24 RX ORDER — FOLIC ACID 1 MG/1
TABLET ORAL
COMMUNITY
Start: 2021-11-17 | End: 2022-07-13

## 2021-11-24 RX ORDER — ALBUTEROL SULFATE 90 UG/1
180 AEROSOL, METERED RESPIRATORY (INHALATION)
COMMUNITY
Start: 2021-11-17 | End: 2021-12-17

## 2021-11-24 RX ORDER — PREDNISONE 20 MG/1
40 TABLET ORAL DAILY
COMMUNITY
Start: 2021-11-05 | End: 2022-07-13

## 2021-11-24 NOTE — PROGRESS NOTES
"Subjective    Chief Complaint   Patient presents with   • Follow-up     Pt states blood with urinating, trouble urinating       History of Present Illness    Caroline Conley is a 26 y.o. female who presents for hematuria since getting Covid early November.  Patient was treated for UTI.  She states she also has trouble urinating.  We discussed possible urologic evaluation needed but will await urine studies.  She also states she had colonoscopy in 2020 for rectal bleeding which has been persistent although her colonoscopy and endoscopy were totally negative.  She still has episodic rectal bleeding and would like a second opinion from colorectal specialist.  She does have some lower abdominal tenderness she states off and on but has not had a recent ultrasound.    Obstetric History:  OB History        1    Para   1    Term   1            AB        Living   1       SAB        IAB        Ectopic        Molar        Multiple   0    Live Births   1               Menstrual History:     No LMP recorded. (Menstrual status: Other).       Past Medical History:   Diagnosis Date   • COVID-19 2021   • GERD (gastroesophageal reflux disease)    • Herpes     last outbreak , no current outbrek, FOB does not know   • Hyperemesis gravidarum    • Migraine    • Panic attack    • Seizure (HCC)      Family History   Problem Relation Age of Onset   • Thyroid disease Mother    • IVON disease Mother        The following portions of the patient's history were reviewed and updated as appropriate: allergies, current medications, past medical history, past surgical history and problem list.    Review of Systems  As per HPI       Objective   Physical Exam  Vagina without any blood.  Cervix without lesion.  IUD string in place.  Uterus normal size and shape and nontender.  Adnexa negative.  Rectal exam with no mass noted just small hemorrhoids not bleeding today.  BP 98/64   Ht 157.5 cm (62\")   Wt 83.9 kg (185 lb)   " BMI 33.84 kg/m²     Assessment/Plan   Diagnoses and all orders for this visit:    1. Hematuria, unspecified type (Primary)  -     Urine Culture - Urine, Urine, Clean Catch  -     Urinalysis With Microscopic - Urine, Clean Catch    2. Rectal bleeding  -     Ambulatory Referral to Colorectal Surgery    3. Pelvic pain  -     US Non-ob Transvaginal; Future        Impression and plan.  Will get a urinalysis and culture before urologic evaluation for hematuria and difficulty with urination.  We should have these results back by Monday.  Due to patient's history of perpetual rectal bleeding, although she has had a negative colonoscopy and endoscopy in August 2020.,  Will get colorectal consultation as a second opinion.  Small hemorrhoids noted on exam today.  Patient will return to the office in 4 weeks which time we will also get a pelvic ultrasound due to her occasional history of pelvic pain.

## 2021-11-26 LAB
APPEARANCE UR: CLEAR
BACTERIA #/AREA URNS HPF: ABNORMAL /[HPF]
BACTERIA UR CULT: NORMAL
BACTERIA UR CULT: NORMAL
BILIRUB UR QL STRIP: NEGATIVE
CASTS URNS QL MICRO: ABNORMAL /LPF
COLOR UR: YELLOW
CRYSTALS URNS MICRO: ABNORMAL
EPI CELLS #/AREA URNS HPF: ABNORMAL /HPF (ref 0–10)
GLUCOSE UR QL: NEGATIVE
HGB UR QL STRIP: ABNORMAL
KETONES UR QL STRIP: NEGATIVE
LEUKOCYTE ESTERASE UR QL STRIP: NEGATIVE
MICRO URNS: ABNORMAL
NITRITE UR QL STRIP: NEGATIVE
PH UR STRIP: 5 [PH] (ref 5–7.5)
PROT UR QL STRIP: ABNORMAL
RBC #/AREA URNS HPF: ABNORMAL /HPF (ref 0–2)
SP GR UR: >=1.03 (ref 1–1.03)
UNIDENT CRYS URNS QL MICRO: PRESENT
UROBILINOGEN UR STRIP-MCNC: 0.2 MG/DL (ref 0.2–1)
WBC #/AREA URNS HPF: ABNORMAL /HPF (ref 0–5)

## 2021-11-30 ENCOUNTER — TELEPHONE (OUTPATIENT)
Dept: OBSTETRICS AND GYNECOLOGY | Facility: CLINIC | Age: 26
End: 2021-11-30

## 2021-11-30 NOTE — TELEPHONE ENCOUNTER
----- Message from Axel Espinoza MD sent at 11/30/2021 12:24 PM EST -----  Please tell patient that her urine culture was negative for infection.  Her urinalysis showed 1+ blood on dipstick but when they actually checked microscopically she had no red cells or white cells present so I do not think any further evaluation is needed for her urine.  REY

## 2021-11-30 NOTE — PROGRESS NOTES
Please tell patient that her urine culture was negative for infection.  Her urinalysis showed 1+ blood on dipstick but when they actually checked microscopically she had no red cells or white cells present so I do not think any further evaluation is needed for her urine.  REY

## 2022-01-04 ENCOUNTER — TELEPHONE (OUTPATIENT)
Dept: OBSTETRICS AND GYNECOLOGY | Facility: CLINIC | Age: 27
End: 2022-01-04

## 2022-01-28 PROBLEM — K29.00 ACUTE GASTRITIS: Status: ACTIVE | Noted: 2020-05-21

## 2022-01-28 PROBLEM — F19.10 POLYSUBSTANCE ABUSE (HCC): Status: ACTIVE | Noted: 2019-07-12

## 2022-01-28 PROBLEM — F10.10 ALCOHOL ABUSE: Status: ACTIVE | Noted: 2019-07-12

## 2022-01-28 PROBLEM — F19.11 SUBSTANCE ABUSE IN REMISSION (HCC): Status: ACTIVE | Noted: 2021-06-28

## 2022-01-28 PROBLEM — Z20.818 EXPOSURE TO STREPTOCOCCAL PHARYNGITIS: Status: ACTIVE | Noted: 2020-01-06

## 2022-01-28 PROBLEM — H92.09 OTALGIA: Status: ACTIVE | Noted: 2019-02-11

## 2022-01-28 PROBLEM — R53.83 FATIGUE: Status: ACTIVE | Noted: 2020-04-30

## 2022-01-28 PROBLEM — R29.818 NEUROLOGICAL FINDING: Status: ACTIVE | Noted: 2019-07-12

## 2022-01-28 PROBLEM — M79.10 MUSCLE PAIN: Status: ACTIVE | Noted: 2020-08-06

## 2022-01-28 PROBLEM — I73.00 RAYNAUD'S DISEASE: Status: ACTIVE | Noted: 2021-06-28

## 2022-01-28 PROBLEM — J06.9 ACUTE UPPER RESPIRATORY INFECTION: Status: ACTIVE | Noted: 2017-08-21

## 2022-01-28 PROBLEM — R30.0 DIFFICULT OR PAINFUL URINATION: Status: ACTIVE | Noted: 2018-09-04

## 2022-01-28 PROBLEM — E78.5 HYPERLIPIDEMIA: Status: ACTIVE | Noted: 2020-04-30

## 2022-01-28 PROBLEM — R09.89 PULMONARY CONGESTION: Status: ACTIVE | Noted: 2018-04-05

## 2022-01-28 PROBLEM — K59.00 CONSTIPATION: Status: ACTIVE | Noted: 2020-07-14

## 2022-01-28 PROBLEM — F41.9 ANXIETY: Status: ACTIVE | Noted: 2019-07-12

## 2022-01-28 PROBLEM — F32.A DEPRESSIVE DISORDER: Status: ACTIVE | Noted: 2019-09-12

## 2022-01-28 PROBLEM — R05.9 COUGH: Status: ACTIVE | Noted: 2018-04-05

## 2022-01-28 PROBLEM — I48.91 ATRIAL FIBRILLATION: Status: ACTIVE | Noted: 2022-01-28

## 2022-01-28 PROBLEM — K92.1 HEMATOCHEZIA: Status: ACTIVE | Noted: 2020-08-09

## 2022-01-28 PROBLEM — R68.89 SPELLS OF DECREASED ATTENTIVENESS: Status: ACTIVE | Noted: 2021-05-16

## 2022-01-28 PROBLEM — R56.9 SEIZURE-LIKE ACTIVITY: Status: ACTIVE | Noted: 2019-07-12

## 2022-01-28 PROBLEM — R07.9 CHEST PAIN: Status: ACTIVE | Noted: 2020-08-09

## 2022-01-28 PROBLEM — R00.2 PALPITATIONS: Status: ACTIVE | Noted: 2020-08-09

## 2022-01-28 PROBLEM — R10.9 ABDOMINAL PAIN: Status: ACTIVE | Noted: 2020-08-09

## 2022-01-28 PROBLEM — F41.0 PANIC DISORDER (EPISODIC PAROXYSMAL ANXIETY): Status: ACTIVE | Noted: 2019-02-11

## 2022-01-28 PROBLEM — R00.0 TACHYCARDIA: Status: ACTIVE | Noted: 2021-05-21

## 2022-01-28 PROBLEM — Z78.9 USES CONTRACEPTION: Status: ACTIVE | Noted: 2017-11-02

## 2022-01-28 PROBLEM — I49.9 IRREGULAR HEART RHYTHM: Status: ACTIVE | Noted: 2022-01-28

## 2022-01-28 PROBLEM — R41.840 ATTENTION DISTURBANCE: Status: ACTIVE | Noted: 2021-05-16

## 2022-01-28 PROBLEM — R20.2 TINGLING OF SKIN: Status: ACTIVE | Noted: 2021-04-20

## 2022-01-28 PROBLEM — J30.9 ALLERGIC RHINITIS: Status: ACTIVE | Noted: 2017-08-21

## 2022-01-28 PROBLEM — G43.909 MIGRAINE: Status: ACTIVE | Noted: 2020-04-30

## 2022-01-28 PROBLEM — R21 RASH: Status: ACTIVE | Noted: 2020-05-12

## 2022-01-28 PROBLEM — R55 SYNCOPE: Status: ACTIVE | Noted: 2020-08-09

## 2022-01-28 PROBLEM — L02.419 ABSCESS OF AXILLA: Status: ACTIVE | Noted: 2018-03-07

## 2022-01-28 PROBLEM — F10.11 NONDEPENDENT ALCOHOL ABUSE, IN REMISSION: Status: ACTIVE | Noted: 2021-06-28

## 2022-01-28 PROBLEM — K21.9 GASTRO-ESOPHAGEAL REFLUX DISEASE WITHOUT ESOPHAGITIS: Status: ACTIVE | Noted: 2017-08-21

## 2022-01-28 PROBLEM — N89.8 VAGINAL DISCHARGE: Status: ACTIVE | Noted: 2019-10-23

## 2022-01-28 RX ORDER — NALTREXONE HYDROCHLORIDE 50 MG/1
50 TABLET, FILM COATED ORAL DAILY
COMMUNITY
Start: 2022-01-11

## 2022-01-28 RX ORDER — ALBUTEROL SULFATE 90 UG/1
POWDER, METERED RESPIRATORY (INHALATION)
COMMUNITY
Start: 2021-12-28 | End: 2023-01-18

## 2022-02-09 ENCOUNTER — OFFICE VISIT (OUTPATIENT)
Dept: OBSTETRICS AND GYNECOLOGY | Facility: CLINIC | Age: 27
End: 2022-02-09

## 2022-02-09 VITALS
SYSTOLIC BLOOD PRESSURE: 98 MMHG | DIASTOLIC BLOOD PRESSURE: 64 MMHG | HEIGHT: 62 IN | BODY MASS INDEX: 34.04 KG/M2 | WEIGHT: 185 LBS

## 2022-02-09 DIAGNOSIS — T83.32XA INTRAUTERINE CONTRACEPTIVE DEVICE THREADS LOST, INITIAL ENCOUNTER: ICD-10-CM

## 2022-02-09 DIAGNOSIS — N89.8 VAGINAL DISCHARGE: ICD-10-CM

## 2022-02-09 DIAGNOSIS — Z01.411 ENCOUNTER FOR GYNECOLOGICAL EXAMINATION WITH ABNORMAL FINDING: Primary | ICD-10-CM

## 2022-02-09 DIAGNOSIS — R35.0 URINARY FREQUENCY: ICD-10-CM

## 2022-02-09 PROCEDURE — 2014F MENTAL STATUS ASSESS: CPT | Performed by: OBSTETRICS & GYNECOLOGY

## 2022-02-09 PROCEDURE — 99395 PREV VISIT EST AGE 18-39: CPT | Performed by: OBSTETRICS & GYNECOLOGY

## 2022-02-09 NOTE — PROGRESS NOTES
Subjective    CC annual exam.  Patient having some breast tenderness and fullness, urinary frequency, and hair loss.  History of Present Illness    Caroline Conley is a 26 y.o. female who presents for annual exam.  Non-smoker.  Patient has had some recent urinary frequency so needs a UA and culture.  She also has had some vaginal discharge.  She had a Mirena IUD placed a year ago.  She has had some recent hair loss but has normal thyroid recently.  She also complains of some breast tenderness but had a negative serum hCG recently.  She is due for an annual exam.    Obstetric History:  OB History        1    Para   1    Term   1            AB        Living   1       SAB        IAB        Ectopic        Molar        Multiple   0    Live Births   1               Menstrual History:     No LMP recorded. (Menstrual status: Other).       Past Medical History:   Diagnosis Date   • Abscess of axilla 3/7/2018   • Abscess of face 10/27/2020   • Acute gastritis 2020   • Alcohol abuse 2019    SEVERE DEPENDENCE, IN REMISSION   • Allergic rhinitis    • Anxiety 2019   • ASCUS with positive high risk HPV cervical 2010   • Asthma    • Atrial fibrillation (HCC) 2022   • Atypical chest pain 2019    SEEN AT [facility]   • Blood type A+    • Breast tenderness 2019   • Chest pain 2020   • Constipation 2020   • COVID-19 2021    SEEN AT [facility]   • Decreased libido 2021   • Depression    • DUB (dysfunctional uterine bleeding) 2020    SEEN AT [facility]   • Dysmenorrhea 2020   • Dyspareunia in female 2020   • Epigastric pain 2020    SEEN AT [facility]   • GERD (gastroesophageal reflux disease)    • Hair loss 2022   • Hematochezia 2020   • Hematuria 2021   • Herpes     last outbreak , no current outbrek, FOB does not know   • Hyperglycemia 2021   • Hyperlipidemia 2020   • Hypokalemia 2021   • Irregular heart rhythm 2022   •  Irregular menses 12/2019   • Leiomyoma 04/16/2019    SEEN AT [facility]   • Migraine    • Mononucleosis 07/2020   • Palpitations 08/08/2020    SEEN AT [facility]   • Panic attack    • Panic disorder (episodic paroxysmal anxiety) 2/11/2019   • Paresthesias 08/04/2021    SEEN AT [facility]   • Peripheral neuropathy 02/23/2021    SEEN AT [facility]   • PMS (premenstrual syndrome)    • Polyosteoarthritis    • Polysubstance abuse (HCC) 7/12/2019    USED METH, IN REMISSION   • Raynaud's disease 6/28/2021   • Rectal bleeding 11/2021   • Seasonal allergies    • Seizure-like activity (HCC) 04/18/2021    SEEN AT [facility]   • Sinus tachycardia 06/29/2019    SEEN AT [facility]   • Spells of decreased attentiveness 05/16/2021    ADMITTED TO [facility]   • Syncope and collapse 08/09/2020    ADMITTED TO [facility]   • Tachycardia 5/21/2021   • Vitamin B 12 deficiency    • Vitamin D deficiency      Family History   Problem Relation Age of Onset   • Thyroid disease Mother    • IVON disease Mother    • Alcohol abuse Mother    • Bipolar disorder Mother    • Mental illness Mother    • Alcohol abuse Father    • Alcohol abuse Maternal Grandmother    • Hypertension Maternal Grandmother    • Mental illness Maternal Grandmother    • Mental illness Maternal Aunt      Social History     Tobacco Use   Smoking Status Never Smoker   Smokeless Tobacco Never Used         The following portions of the patient's history were reviewed and updated as appropriate: allergies, current medications, past family history, past medical history, past social history, past surgical history and problem list.    Review of Systems   Constitutional: Negative.  Negative for fever and unexpected weight change.   HENT: Negative.    Respiratory: Negative for shortness of breath and wheezing.    Cardiovascular: Negative for chest pain, palpitations and leg swelling.   Gastrointestinal: Negative for abdominal pain, anal bleeding and blood in stool.   Genitourinary: Positive for  frequency. Negative for dysuria, pelvic pain, urgency, vaginal bleeding, vaginal discharge and vaginal pain.   Musculoskeletal:        Breast tenderness, hair loss   Skin: Negative.    Neurological: Negative.    Hematological: Negative.  Negative for adenopathy.   Psychiatric/Behavioral: Negative.  Negative for dysphoric mood. The patient is not nervous/anxious.             Objective   Physical Exam  Exam conducted with a chaperone present.   Constitutional:       Appearance: Normal appearance. She is well-developed.   HENT:      Head: Normocephalic.   Neck:      Thyroid: No thyromegaly.      Trachea: Trachea normal. No tracheal deviation.   Cardiovascular:      Rate and Rhythm: Normal rate and regular rhythm.      Heart sounds: Normal heart sounds. No murmur heard.      Pulmonary:      Effort: Pulmonary effort is normal.      Breath sounds: Normal breath sounds.   Chest:   Breasts:      Right: Normal. No mass, nipple discharge, tenderness or axillary adenopathy.      Left: Normal. No mass, nipple discharge, tenderness or axillary adenopathy.       Abdominal:      Palpations: Abdomen is soft. There is no mass.      Tenderness: There is no abdominal tenderness.      Hernia: No hernia is present.   Genitourinary:     General: Normal vulva.      Labia:         Right: No tenderness or lesion.         Left: No tenderness or lesion.       Urethra: No prolapse or urethral lesion.      Vagina: Vaginal discharge present. No lesions.      Cervix: No cervical motion tenderness.      Uterus: Not enlarged and not tender.       Adnexa:         Right: No mass or tenderness.          Left: No mass or tenderness.        Rectum: Normal. No external hemorrhoid or internal hemorrhoid. Normal anal tone.      Comments: External genitalia normal.  Very difficult to see IUD string posteriorly in the vagina.  Will need ultrasound.  Vaginal culture performed due to vaginal discharge.  Lymphadenopathy:      Cervical: No cervical adenopathy.  "     Upper Body:      Right upper body: No axillary adenopathy.      Left upper body: No axillary adenopathy.   Skin:     General: Skin is warm and dry.      Findings: No rash.   Neurological:      Mental Status: She is alert and oriented to person, place, and time.   Psychiatric:         Behavior: Behavior normal.         BP 98/64   Ht 157.5 cm (62\")   Wt 83.9 kg (185 lb)   BMI 33.84 kg/m²     Assessment/Plan   Diagnoses and all orders for this visit:    1. Encounter for gynecological examination with abnormal finding (Primary)  -     IGP,rfx Aptima HPV All Pth    2. Intrauterine contraceptive device threads lost, initial encounter  -     US Non-ob Transvaginal; Future    3. Vaginal discharge  -     NuSwab VG+ - Swab, Vagina    4. Urinary frequency  -     Urinalysis With Microscopic - Urine, Clean Catch  -     Urine Culture - Urine, Urine, Clean Catch        Return to office 4 weeks for ultrasound and follow-up to check IUD location.  Counseled about screening for venereal disease and evaluating vaginal discharge.             "

## 2022-02-11 LAB
A VAGINAE DNA VAG QL NAA+PROBE: ABNORMAL SCORE
APPEARANCE UR: CLEAR
BACTERIA #/AREA URNS HPF: ABNORMAL /[HPF]
BACTERIA UR CULT: ABNORMAL
BILIRUB UR QL STRIP: NEGATIVE
BVAB2 DNA VAG QL NAA+PROBE: ABNORMAL SCORE
C ALBICANS DNA VAG QL NAA+PROBE: POSITIVE
C GLABRATA DNA VAG QL NAA+PROBE: NEGATIVE
C TRACH DNA VAG QL NAA+PROBE: NEGATIVE
CASTS URNS QL MICRO: ABNORMAL /LPF
COLOR UR: YELLOW
EPI CELLS #/AREA URNS HPF: ABNORMAL /HPF (ref 0–10)
GLUCOSE UR QL STRIP: NEGATIVE
HGB UR QL STRIP: ABNORMAL
KETONES UR QL STRIP: NEGATIVE
LEUKOCYTE ESTERASE UR QL STRIP: NEGATIVE
MEGA1 DNA VAG QL NAA+PROBE: ABNORMAL SCORE
MICRO URNS: ABNORMAL
MUCOUS THREADS URNS QL MICRO: PRESENT
N GONORRHOEA DNA VAG QL NAA+PROBE: NEGATIVE
NITRITE UR QL STRIP: NEGATIVE
PH UR STRIP: 6 [PH] (ref 5–7.5)
PROT UR QL STRIP: NEGATIVE
RBC #/AREA URNS HPF: ABNORMAL /HPF (ref 0–2)
SP GR UR STRIP: 1.03 (ref 1–1.03)
T VAGINALIS DNA VAG QL NAA+PROBE: NEGATIVE
UROBILINOGEN UR STRIP-MCNC: 0.2 MG/DL (ref 0.2–1)
WBC #/AREA URNS HPF: ABNORMAL /HPF (ref 0–5)

## 2022-02-15 ENCOUNTER — TELEPHONE (OUTPATIENT)
Dept: OBSTETRICS AND GYNECOLOGY | Facility: CLINIC | Age: 27
End: 2022-02-15

## 2022-02-15 NOTE — TELEPHONE ENCOUNTER
Pt is calling about receiving test results in her My Chart. She's not sure what they mean and I don't see a doctor's note on them just yet.     Please advise  Thanks niki

## 2022-02-15 NOTE — TELEPHONE ENCOUNTER
Tried to call patient concerning lab results but unable to leave message on her phone.  We will try again tomorrow.  REY

## 2022-02-17 ENCOUNTER — TELEPHONE (OUTPATIENT)
Dept: OBSTETRICS AND GYNECOLOGY | Facility: CLINIC | Age: 27
End: 2022-02-17

## 2022-02-17 RX ORDER — FLUCONAZOLE 150 MG/1
150 TABLET ORAL ONCE
Qty: 1 TABLET | Refills: 0 | Status: SHIPPED | OUTPATIENT
Start: 2022-02-17 | End: 2022-02-17

## 2022-02-18 NOTE — TELEPHONE ENCOUNTER
This is a penta pt. She was calling for her test results. I read her his my chart message and she wanted to know since she is already on amoxicillin for an ear infection if that would also clear up the group B strep.     Please advise  Thanks niki

## 2022-03-30 ENCOUNTER — OFFICE VISIT (OUTPATIENT)
Dept: OBSTETRICS AND GYNECOLOGY | Facility: CLINIC | Age: 27
End: 2022-03-30

## 2022-03-30 VITALS
SYSTOLIC BLOOD PRESSURE: 109 MMHG | WEIGHT: 185 LBS | HEIGHT: 62 IN | BODY MASS INDEX: 34.04 KG/M2 | DIASTOLIC BLOOD PRESSURE: 70 MMHG

## 2022-03-30 DIAGNOSIS — N39.3 SUI (STRESS URINARY INCONTINENCE, FEMALE): Primary | ICD-10-CM

## 2022-03-30 DIAGNOSIS — R35.0 URINARY FREQUENCY: ICD-10-CM

## 2022-03-30 PROCEDURE — 99213 OFFICE O/P EST LOW 20 MIN: CPT | Performed by: OBSTETRICS & GYNECOLOGY

## 2022-03-31 ENCOUNTER — TELEPHONE (OUTPATIENT)
Dept: OBSTETRICS AND GYNECOLOGY | Facility: CLINIC | Age: 27
End: 2022-03-31

## 2022-03-31 LAB
ALBUMIN SERPL-MCNC: 4.4 G/DL (ref 3.9–5)
ALBUMIN/GLOB SERPL: 1.8 {RATIO} (ref 1.2–2.2)
ALP SERPL-CCNC: 81 IU/L (ref 44–121)
ALT SERPL-CCNC: 19 IU/L (ref 0–32)
APPEARANCE UR: CLEAR
AST SERPL-CCNC: 17 IU/L (ref 0–40)
BACTERIA #/AREA URNS HPF: NORMAL /[HPF]
BASOPHILS # BLD AUTO: 0 X10E3/UL (ref 0–0.2)
BASOPHILS NFR BLD AUTO: 1 %
BILIRUB SERPL-MCNC: 0.4 MG/DL (ref 0–1.2)
BILIRUB UR QL STRIP: NEGATIVE
BUN SERPL-MCNC: 11 MG/DL (ref 6–20)
BUN/CREAT SERPL: 14 (ref 9–23)
CALCIUM SERPL-MCNC: 9 MG/DL (ref 8.7–10.2)
CASTS URNS QL MICRO: NORMAL /LPF
CHLORIDE SERPL-SCNC: 101 MMOL/L (ref 96–106)
CO2 SERPL-SCNC: 22 MMOL/L (ref 20–29)
COLOR UR: YELLOW
CREAT SERPL-MCNC: 0.81 MG/DL (ref 0.57–1)
EGFRCR SERPLBLD CKD-EPI 2021: 102 ML/MIN/1.73
EOSINOPHIL # BLD AUTO: 0.2 X10E3/UL (ref 0–0.4)
EOSINOPHIL NFR BLD AUTO: 3 %
EPI CELLS #/AREA URNS HPF: NORMAL /HPF (ref 0–10)
ERYTHROCYTE [DISTWIDTH] IN BLOOD BY AUTOMATED COUNT: 11.8 % (ref 11.7–15.4)
EST. AVERAGE GLUCOSE BLD GHB EST-MCNC: 111 MG/DL
GLOBULIN SER CALC-MCNC: 2.5 G/DL (ref 1.5–4.5)
GLUCOSE SERPL-MCNC: 80 MG/DL (ref 65–99)
GLUCOSE UR QL STRIP: NEGATIVE
HBA1C MFR BLD: 5.5 % (ref 4.8–5.6)
HCT VFR BLD AUTO: 39.8 % (ref 34–46.6)
HGB BLD-MCNC: 13.4 G/DL (ref 11.1–15.9)
HGB UR QL STRIP: ABNORMAL
IMM GRANULOCYTES # BLD AUTO: 0 X10E3/UL (ref 0–0.1)
IMM GRANULOCYTES NFR BLD AUTO: 0 %
KETONES UR QL STRIP: NEGATIVE
LEUKOCYTE ESTERASE UR QL STRIP: NEGATIVE
LYMPHOCYTES # BLD AUTO: 2.8 X10E3/UL (ref 0.7–3.1)
LYMPHOCYTES NFR BLD AUTO: 43 %
MCH RBC QN AUTO: 30.4 PG (ref 26.6–33)
MCHC RBC AUTO-ENTMCNC: 33.7 G/DL (ref 31.5–35.7)
MCV RBC AUTO: 90 FL (ref 79–97)
MICRO URNS: ABNORMAL
MONOCYTES # BLD AUTO: 0.5 X10E3/UL (ref 0.1–0.9)
MONOCYTES NFR BLD AUTO: 8 %
NEUTROPHILS # BLD AUTO: 2.9 X10E3/UL (ref 1.4–7)
NEUTROPHILS NFR BLD AUTO: 45 %
NITRITE UR QL STRIP: NEGATIVE
PH UR STRIP: 6.5 [PH] (ref 5–7.5)
PLATELET # BLD AUTO: 389 X10E3/UL (ref 150–450)
POTASSIUM SERPL-SCNC: 4.3 MMOL/L (ref 3.5–5.2)
PROT SERPL-MCNC: 6.9 G/DL (ref 6–8.5)
PROT UR QL STRIP: NEGATIVE
RBC # BLD AUTO: 4.41 X10E6/UL (ref 3.77–5.28)
RBC #/AREA URNS HPF: NORMAL /HPF (ref 0–2)
SODIUM SERPL-SCNC: 138 MMOL/L (ref 134–144)
SP GR UR STRIP: 1.01 (ref 1–1.03)
UROBILINOGEN UR STRIP-MCNC: 0.2 MG/DL (ref 0.2–1)
WBC # BLD AUTO: 6.4 X10E3/UL (ref 3.4–10.8)
WBC #/AREA URNS HPF: NORMAL /HPF (ref 0–5)

## 2022-04-01 LAB
BACTERIA UR CULT: NO GROWTH
BACTERIA UR CULT: NORMAL

## 2022-06-23 ENCOUNTER — TELEPHONE (OUTPATIENT)
Dept: OBSTETRICS AND GYNECOLOGY | Facility: CLINIC | Age: 27
End: 2022-06-23

## 2022-07-13 ENCOUNTER — OFFICE VISIT (OUTPATIENT)
Dept: OBSTETRICS AND GYNECOLOGY | Facility: CLINIC | Age: 27
End: 2022-07-13

## 2022-07-13 VITALS
HEIGHT: 62 IN | WEIGHT: 185 LBS | BODY MASS INDEX: 34.04 KG/M2 | SYSTOLIC BLOOD PRESSURE: 100 MMHG | DIASTOLIC BLOOD PRESSURE: 78 MMHG

## 2022-07-13 DIAGNOSIS — N89.8 VAGINAL DISCHARGE: Primary | ICD-10-CM

## 2022-07-13 DIAGNOSIS — R35.0 URINARY FREQUENCY: ICD-10-CM

## 2022-07-13 PROCEDURE — 99213 OFFICE O/P EST LOW 20 MIN: CPT | Performed by: OBSTETRICS & GYNECOLOGY

## 2022-07-13 NOTE — PROGRESS NOTES
Subjective    Chief Complaint   Patient presents with   • Follow-up     Pt states having odor and discharge       History of Present Illness    Caroline Conley is a 26 y.o. female who presents for vaginal odor and discharge without itching.  Also has some urinary frequency..  Patient had a negative Pap smear this year and did have BV previously.  Has been treated many times with Flagyl but feels it never goes away.    Obstetric History:  OB History        1    Para   1    Term   1            AB        Living   1       SAB        IAB        Ectopic        Molar        Multiple   0    Live Births   1               Menstrual History:     No LMP recorded. (Menstrual status: Other).       Past Medical History:   Diagnosis Date   • Abscess of axilla 3/7/2018   • Abscess of face 10/27/2020   • Acute gastritis 2020   • Alcohol abuse 2019    SEVERE DEPENDENCE, IN REMISSION   • Allergic rhinitis    • Anxiety 2019   • ASCUS with positive high risk HPV cervical 2010   • Asthma    • Atrial fibrillation (HCC) 2022   • Atypical chest pain 2019    SEEN AT [facility]   • Blood type A+    • Breast tenderness 2019   • Chest pain 2020   • Constipation 2020   • COVID-19 2021    SEEN AT [facility]   • Decreased libido 2021   • Depression    • DUB (dysfunctional uterine bleeding) 2020    SEEN AT [facility]   • Dysmenorrhea 2020   • Dyspareunia in female 2020   • Epigastric pain 2020    SEEN AT [facility]   • GERD (gastroesophageal reflux disease)    • Hair loss 2022   • Hematochezia 2020   • Hematuria 2021   • Herpes     last outbreak , no current outbrek, FOB does not know   • Hyperglycemia 2021   • Hyperlipidemia 2020   • Hypokalemia 2021   • Irregular heart rhythm 2022   • Irregular menses 2019   • Leiomyoma 2019    SEEN AT [facility]   • Migraine    • Mononucleosis 2020   • Palpitations 2020    SEEN AT  "[facility]   • Panic attack    • Panic disorder (episodic paroxysmal anxiety) 2/11/2019   • Paresthesias 08/04/2021    SEEN AT [facility]   • Peripheral neuropathy 02/23/2021    SEEN AT [facility]   • PMS (premenstrual syndrome)    • Polyosteoarthritis    • Polysubstance abuse (HCC) 7/12/2019    USED METH, IN REMISSION   • Raynaud's disease 6/28/2021   • Rectal bleeding 11/2021   • Seasonal allergies    • Seizure-like activity (HCC) 04/18/2021    SEEN AT [facility]   • Sinus tachycardia 06/29/2019    SEEN AT [facility]   • Spells of decreased attentiveness 05/16/2021    ADMITTED TO [facility]   • Syncope and collapse 08/09/2020    ADMITTED TO [facility]   • Tachycardia 5/21/2021   • Vitamin B 12 deficiency    • Vitamin D deficiency      Family History   Problem Relation Age of Onset   • Thyroid disease Mother    • IVON disease Mother    • Alcohol abuse Mother    • Bipolar disorder Mother    • Mental illness Mother    • Alcohol abuse Father    • Alcohol abuse Maternal Grandmother    • Hypertension Maternal Grandmother    • Mental illness Maternal Grandmother    • Mental illness Maternal Aunt        The following portions of the patient's history were reviewed and updated as appropriate: allergies, current medications, past medical history, past surgical history and problem list.    Review of Systems  As per HPI       Objective   Physical Exam  White discharge noted consistent with possible BV.  Cervix negative.  Bimanual exam negative.  /78   Ht 157.5 cm (62\")   Wt 83.9 kg (185 lb)   BMI 33.84 kg/m²     Assessment & Plan   Diagnoses and all orders for this visit:    1. Vaginal discharge (Primary)  -     NuSwab VG+ - Swab, Vagina    2. Urinary frequency  -     Urine Culture - Urine, Urine, Clean Catch  -     Urinalysis With Microscopic - Urine, Clean Catch        Plan.  Patient to call 1 week for results.  If has BV will consider using MetroGel instead of Flagyl.               "

## 2022-07-15 LAB
A VAGINAE DNA VAG QL NAA+PROBE: NORMAL SCORE
BVAB2 DNA VAG QL NAA+PROBE: NORMAL SCORE
C ALBICANS DNA VAG QL NAA+PROBE: NEGATIVE
C GLABRATA DNA VAG QL NAA+PROBE: NEGATIVE
C TRACH DNA VAG QL NAA+PROBE: NEGATIVE
MEGA1 DNA VAG QL NAA+PROBE: NORMAL SCORE
N GONORRHOEA DNA VAG QL NAA+PROBE: NEGATIVE
T VAGINALIS DNA VAG QL NAA+PROBE: NEGATIVE

## 2022-07-18 ENCOUNTER — TELEPHONE (OUTPATIENT)
Dept: OBSTETRICS AND GYNECOLOGY | Facility: CLINIC | Age: 27
End: 2022-07-18

## 2022-07-18 LAB
APPEARANCE UR: CLEAR
BACTERIA #/AREA URNS HPF: NORMAL /[HPF]
BACTERIA UR CULT: ABNORMAL
BACTERIA UR CULT: ABNORMAL
BILIRUB UR QL STRIP: NEGATIVE
CASTS URNS QL MICRO: NORMAL /LPF
COLOR UR: YELLOW
EPI CELLS #/AREA URNS HPF: NORMAL /HPF (ref 0–10)
GLUCOSE UR QL STRIP: NEGATIVE
HGB UR QL STRIP: ABNORMAL
KETONES UR QL STRIP: NEGATIVE
LEUKOCYTE ESTERASE UR QL STRIP: NEGATIVE
MICRO URNS: ABNORMAL
NITRITE UR QL STRIP: NEGATIVE
OTHER ANTIBIOTIC SUSC ISLT: ABNORMAL
PH UR STRIP: 6.5 [PH] (ref 5–7.5)
PROT UR QL STRIP: NEGATIVE
RBC #/AREA URNS HPF: NORMAL /HPF (ref 0–2)
SP GR UR STRIP: 1.01 (ref 1–1.03)
UROBILINOGEN UR STRIP-MCNC: 0.2 MG/DL (ref 0.2–1)
WBC #/AREA URNS HPF: NORMAL /HPF (ref 0–5)

## 2022-07-18 RX ORDER — SULFAMETHOXAZOLE AND TRIMETHOPRIM 800; 160 MG/1; MG/1
1 TABLET ORAL 2 TIMES DAILY
Qty: 14 TABLET | Refills: 0 | Status: SHIPPED | OUTPATIENT
Start: 2022-07-18 | End: 2022-07-25

## 2022-07-18 NOTE — PROGRESS NOTES
Please tell patient that her urine culture did come back positive and it is sensitive to sulfa drugs so I have sent a prescription to her [facility].

## 2022-07-18 NOTE — TELEPHONE ENCOUNTER
----- Message from Axel Espinoza MD sent at 7/18/2022  1:05 PM EDT -----  Please tell patient that her vaginal culture was totally negative for any infection

## 2022-07-19 ENCOUNTER — TELEPHONE (OUTPATIENT)
Dept: OBSTETRICS AND GYNECOLOGY | Facility: CLINIC | Age: 27
End: 2022-07-19

## 2022-07-19 NOTE — TELEPHONE ENCOUNTER
Patient notified urine culture came back with infection and that medication has been sent to her pharmacy. Vaginal cultures negative.  All per Dr. Espinoza. Patient did not have any further quesitons at this time.

## 2022-07-24 PROCEDURE — 93010 ELECTROCARDIOGRAM REPORT: CPT | Performed by: INTERNAL MEDICINE

## 2022-07-24 PROCEDURE — 99284 EMERGENCY DEPT VISIT MOD MDM: CPT

## 2022-07-25 ENCOUNTER — APPOINTMENT (OUTPATIENT)
Dept: GENERAL RADIOLOGY | Facility: HOSPITAL | Age: 27
End: 2022-07-25

## 2022-07-25 ENCOUNTER — HOSPITAL ENCOUNTER (EMERGENCY)
Facility: HOSPITAL | Age: 27
Discharge: HOME | End: 2022-07-25
Attending: EMERGENCY MEDICINE | Admitting: EMERGENCY MEDICINE

## 2022-07-25 VITALS
HEART RATE: 55 BPM | OXYGEN SATURATION: 100 % | SYSTOLIC BLOOD PRESSURE: 102 MMHG | TEMPERATURE: 98.2 F | DIASTOLIC BLOOD PRESSURE: 68 MMHG

## 2022-07-25 DIAGNOSIS — R51.9 ACUTE NONINTRACTABLE HEADACHE, UNSPECIFIED HEADACHE TYPE: ICD-10-CM

## 2022-07-25 DIAGNOSIS — F41.9 ANXIETY: ICD-10-CM

## 2022-07-25 DIAGNOSIS — R00.2 HEART PALPITATIONS: Primary | ICD-10-CM

## 2022-07-25 LAB
ALBUMIN SERPL-MCNC: 4.4 G/DL (ref 3.5–5.2)
ALBUMIN/GLOB SERPL: 1.4 G/DL
ALP SERPL-CCNC: 76 U/L (ref 39–117)
ALT SERPL W P-5'-P-CCNC: 29 U/L (ref 1–33)
ANION GAP SERPL CALCULATED.3IONS-SCNC: 10.2 MMOL/L (ref 5–15)
AST SERPL-CCNC: 22 U/L (ref 1–32)
BASOPHILS # BLD AUTO: 0.05 10*3/MM3 (ref 0–0.2)
BASOPHILS NFR BLD AUTO: 0.6 % (ref 0–1.5)
BILIRUB SERPL-MCNC: 0.3 MG/DL (ref 0–1.2)
BUN SERPL-MCNC: 11 MG/DL (ref 6–20)
BUN/CREAT SERPL: 12.9 (ref 7–25)
CALCIUM SPEC-SCNC: 9.2 MG/DL (ref 8.6–10.5)
CHLORIDE SERPL-SCNC: 104 MMOL/L (ref 98–107)
CO2 SERPL-SCNC: 24.8 MMOL/L (ref 22–29)
CREAT SERPL-MCNC: 0.85 MG/DL (ref 0.57–1)
DEPRECATED RDW RBC AUTO: 40.3 FL (ref 37–54)
EGFRCR SERPLBLD CKD-EPI 2021: 96.4 ML/MIN/1.73
EOSINOPHIL # BLD AUTO: 0.08 10*3/MM3 (ref 0–0.4)
EOSINOPHIL NFR BLD AUTO: 0.9 % (ref 0.3–6.2)
ERYTHROCYTE [DISTWIDTH] IN BLOOD BY AUTOMATED COUNT: 12.2 % (ref 12.3–15.4)
GLOBULIN UR ELPH-MCNC: 3.1 GM/DL
GLUCOSE SERPL-MCNC: 94 MG/DL (ref 65–99)
HCG SERPL QL: NEGATIVE
HCT VFR BLD AUTO: 40.6 % (ref 34–46.6)
HGB BLD-MCNC: 13.8 G/DL (ref 12–15.9)
IMM GRANULOCYTES # BLD AUTO: 0.02 10*3/MM3 (ref 0–0.05)
IMM GRANULOCYTES NFR BLD AUTO: 0.2 % (ref 0–0.5)
LYMPHOCYTES # BLD AUTO: 2 10*3/MM3 (ref 0.7–3.1)
LYMPHOCYTES NFR BLD AUTO: 22.7 % (ref 19.6–45.3)
MAGNESIUM SERPL-MCNC: 2.2 MG/DL (ref 1.6–2.6)
MCH RBC QN AUTO: 30.5 PG (ref 26.6–33)
MCHC RBC AUTO-ENTMCNC: 34 G/DL (ref 31.5–35.7)
MCV RBC AUTO: 89.8 FL (ref 79–97)
MONOCYTES # BLD AUTO: 0.83 10*3/MM3 (ref 0.1–0.9)
MONOCYTES NFR BLD AUTO: 9.4 % (ref 5–12)
NEUTROPHILS NFR BLD AUTO: 5.83 10*3/MM3 (ref 1.7–7)
NEUTROPHILS NFR BLD AUTO: 66.2 % (ref 42.7–76)
NRBC BLD AUTO-RTO: 0 /100 WBC (ref 0–0.2)
PLATELET # BLD AUTO: 372 10*3/MM3 (ref 140–450)
PMV BLD AUTO: 9.1 FL (ref 6–12)
POTASSIUM SERPL-SCNC: 4.1 MMOL/L (ref 3.5–5.2)
PROT SERPL-MCNC: 7.5 G/DL (ref 6–8.5)
QT INTERVAL: 422 MS
RBC # BLD AUTO: 4.52 10*6/MM3 (ref 3.77–5.28)
SODIUM SERPL-SCNC: 139 MMOL/L (ref 136–145)
T4 FREE SERPL-MCNC: 1.04 NG/DL (ref 0.93–1.7)
TROPONIN T SERPL-MCNC: <0.01 NG/ML (ref 0–0.03)
TSH SERPL DL<=0.05 MIU/L-ACNC: 1.46 UIU/ML (ref 0.27–4.2)
WBC NRBC COR # BLD: 8.81 10*3/MM3 (ref 3.4–10.8)

## 2022-07-25 PROCEDURE — 84703 CHORIONIC GONADOTROPIN ASSAY: CPT | Performed by: EMERGENCY MEDICINE

## 2022-07-25 PROCEDURE — 84484 ASSAY OF TROPONIN QUANT: CPT | Performed by: EMERGENCY MEDICINE

## 2022-07-25 PROCEDURE — 83735 ASSAY OF MAGNESIUM: CPT | Performed by: EMERGENCY MEDICINE

## 2022-07-25 PROCEDURE — 80053 COMPREHEN METABOLIC PANEL: CPT | Performed by: EMERGENCY MEDICINE

## 2022-07-25 PROCEDURE — 71045 X-RAY EXAM CHEST 1 VIEW: CPT

## 2022-07-25 PROCEDURE — 25010000002 KETOROLAC TROMETHAMINE PER 15 MG: Performed by: EMERGENCY MEDICINE

## 2022-07-25 PROCEDURE — 93005 ELECTROCARDIOGRAM TRACING: CPT | Performed by: EMERGENCY MEDICINE

## 2022-07-25 PROCEDURE — 84443 ASSAY THYROID STIM HORMONE: CPT | Performed by: EMERGENCY MEDICINE

## 2022-07-25 PROCEDURE — 85025 COMPLETE CBC W/AUTO DIFF WBC: CPT | Performed by: EMERGENCY MEDICINE

## 2022-07-25 PROCEDURE — 96374 THER/PROPH/DIAG INJ IV PUSH: CPT

## 2022-07-25 PROCEDURE — 84439 ASSAY OF FREE THYROXINE: CPT | Performed by: EMERGENCY MEDICINE

## 2022-07-25 RX ORDER — KETOROLAC TROMETHAMINE 30 MG/ML
30 INJECTION, SOLUTION INTRAMUSCULAR; INTRAVENOUS ONCE
Status: COMPLETED | OUTPATIENT
Start: 2022-07-25 | End: 2022-07-25

## 2022-07-25 RX ADMIN — KETOROLAC TROMETHAMINE 30 MG: 30 INJECTION, SOLUTION INTRAMUSCULAR at 01:06

## 2022-07-25 RX ADMIN — SODIUM CHLORIDE 1000 ML: 9 INJECTION, SOLUTION INTRAVENOUS at 01:03

## 2022-07-25 NOTE — ED PROVIDER NOTES
EMERGENCY DEPARTMENT ENCOUNTER    CHIEF COMPLAINT  Chief Complaint: Heart palpitations  History given by: Patient  History limited by: None  Room Number: 14/14  PMD: Enzo Sanon MD      HPI:  Pt is a 26 y.o. female who presents complaining of heart palpitations that began just prior to ED arrival today.  The patient reports that she was driving home from work when the symptoms began.  She did have associated headache as well as tingling in both upper and lower extremities and shortness of breath.  He reports that she has had similar symptoms previously.  At the time of onset, she did take her propanolol which after about 15 minutes began relieving her symptoms.  She currently complains of a mild headache but denies chest pain, current shortness of breath, nausea/vomiting, or fevers and chills.    Duration: Just prior to ED arrival  Onset: Sudden  Location: Generalized  Radiation: None  Quality: Palpitations/tingling  Intensity/Severity: Moderate  Progression: Improved  Associated Symptoms: Headache, extremity tingling, shortness of breath  Aggravating Factors: None  Alleviating Factors: Propranolol  Previous Episodes: Yes, history of similar  Treatment before arrival: Propranolol    PAST MEDICAL HISTORY  Active Ambulatory Problems     Diagnosis Date Noted   • Herpes simplex 02/21/2017   • Abdominal pain 08/09/2020   • Chest pain 08/09/2020   • Abscess of axilla 03/07/2018   • Acute gastritis 05/21/2020   • Acute upper respiratory infection 08/21/2017   • Alcohol abuse 07/12/2019   • Allergic rhinitis 08/21/2017   • Anxiety 07/12/2019   • Atrial fibrillation (HCC) 01/28/2022   • Attention disturbance 05/16/2021   • Spells of decreased attentiveness 05/16/2021   • Constipation 07/14/2020   • Cough 04/05/2018   • Depressive disorder 09/12/2019   • Difficult or painful urination 09/04/2018   • Exposure to Streptococcal pharyngitis 01/06/2020   • Fatigue 04/30/2020   • Gastro-esophageal reflux disease without  esophagitis 08/21/2017   • Hematochezia 08/09/2020   • Hyperlipidemia 04/30/2020   • Irregular heart rhythm 01/28/2022   • Migraine 04/30/2020   • Muscle pain 08/06/2020   • Neurological finding 07/12/2019   • Seizure-like activity (HCC) 07/12/2019   • Nondependent alcohol abuse, in remission 06/28/2021   • Otalgia 02/11/2019   • Palpitations 08/09/2020   • Panic disorder (episodic paroxysmal anxiety) 02/11/2019   • Polysubstance abuse ([facility]) 07/12/2019   • Pulmonary congestion 04/05/2018   • Rash 05/12/2020   • Raynaud's disease 06/28/2021   • Substance abuse in remission ([facility]) 06/28/2021   • Syncope 08/09/2020   • Tachycardia 05/21/2021   • Tingling of skin 04/20/2021   • Uses contraception 11/02/2017   • Vaginal discharge 10/23/2019     Resolved Ambulatory Problems     Diagnosis Date Noted   • Pregnancy 02/21/2017   • Pregnant 03/02/2017   • Normal labor 03/02/2017   • Arrested active phase of labor 03/02/2017     Past Medical History:   Diagnosis Date   • Abscess of face 10/27/2020   • ASCUS with positive high risk HPV cervical 11/29/2010   • Asthma    • Atypical chest pain 04/20/2019   • Blood type A+    • Breast tenderness 12/2019   • COVID-19 11/08/2021   • Decreased libido 08/2021   • Depression    • DUB (dysfunctional uterine bleeding) 01/11/2020   • Dysmenorrhea 01/2020   • Dyspareunia in female 11/2020   • Epigastric pain 08/06/2020   • GERD (gastroesophageal reflux disease)    • Hair loss 01/2022   • Hematuria 11/2021   • Herpes    • Hyperglycemia 03/2021   • Hypokalemia 11/2021   • Irregular menses 12/2019   • Leiomyoma 04/16/2019   • Mononucleosis 07/2020   • Panic attack    • Paresthesias 08/04/2021   • Peripheral neuropathy 02/23/2021   • PMS (premenstrual syndrome)    • Polyosteoarthritis    • Rectal bleeding 11/2021   • Seasonal allergies    • Sinus tachycardia 06/29/2019   • Syncope and collapse 08/09/2020   • Vitamin B 12 deficiency    • Vitamin D deficiency        PAST SURGICAL HISTORY  Past  Surgical History:   Procedure Laterality Date   •  SECTION N/A 3/2/2017    Procedure:  SECTION PRIMARY;  Surgeon: Panda Skelton MD;  [facility];  Service:    • EAR TUBES Bilateral    • ENDOSCOPY AND COLONOSCOPY N/A 2020    BOTH SCOPES WNL, NO SPECIMENS COLLECTED, DR. LOU LINDER AT [facility]   • INTRAUTERINE DEVICE INSERTION N/A 2020    DR. SIENNA KEEN   • TONSILLECTOMY AND ADENOIDECTOMY Bilateral    • WISDOM TOOTH EXTRACTION Bilateral        FAMILY HISTORY  Family History   Problem Relation Age of Onset   • Thyroid disease Mother    • IVON disease Mother    • Alcohol abuse Mother    • Bipolar disorder Mother    • Mental illness Mother    • Alcohol abuse Father    • Alcohol abuse Maternal Grandmother    • Hypertension Maternal Grandmother    • Mental illness Maternal Grandmother    • Mental illness Maternal Aunt        SOCIAL HISTORY  Social History     Socioeconomic History   • Marital status: Single   Tobacco Use   • Smoking status: Never Smoker   • Smokeless tobacco: Never Used   Substance and Sexual Activity   • Alcohol use: Not Currently   • Drug use: No     Comment: Sober x 2 years - used to do meth   • Sexual activity: Yes     Partners: Male     Birth control/protection: I.U.D.       ALLERGIES  Cefdinir, Cephalosporins, and Venlafaxine    REVIEW OF SYSTEMS  Review of Systems   Constitutional: Negative for fever.   HENT: Negative for sore throat.    Eyes: Negative.    Respiratory: Positive for shortness of breath. Negative for cough.    Cardiovascular: Positive for palpitations. Negative for chest pain.   Gastrointestinal: Negative for abdominal pain, diarrhea and vomiting.   Genitourinary: Negative for dysuria.   Musculoskeletal: Negative for neck pain.   Skin: Negative for rash.   Allergic/Immunologic: Negative.    Neurological: Positive for headaches. Negative for weakness and numbness.        Tingling to extremities   Hematological: Negative.     Psychiatric/Behavioral: Negative.    All other systems reviewed and are negative.      PHYSICAL EXAM  ED Triage Vitals [07/24/22 2341]   Temp Heart Rate Resp BP SpO2   98.2 °F (36.8 °C) 75 -- 99/70 100 %      Temp src Heart Rate Source Patient Position BP Location FiO2 (%)   Tympanic -- -- -- --       Physical Exam  Vitals and nursing note reviewed.   Constitutional:       General: She is not in acute distress.  HENT:      Head: Normocephalic and atraumatic.   Eyes:      Pupils: Pupils are equal, round, and reactive to light.   Cardiovascular:      Rate and Rhythm: Normal rate and regular rhythm.      Heart sounds: Normal heart sounds.   Pulmonary:      Effort: Pulmonary effort is normal. No respiratory distress.      Breath sounds: Normal breath sounds.   Abdominal:      Palpations: Abdomen is soft.      Tenderness: There is no abdominal tenderness. There is no guarding or rebound.   Musculoskeletal:         General: Normal range of motion.      Cervical back: Normal range of motion and neck supple.   Skin:     General: Skin is warm and dry.      Findings: No rash.   Neurological:      Mental Status: She is alert and oriented to person, place, and time.      Sensory: Sensation is intact.   Psychiatric:         Mood and Affect: Mood and affect normal.         LAB RESULTS  Lab Results (last 24 hours)     Procedure Component Value Units Date/Time    CBC & Differential [[id number]]  (Abnormal) Collected: 07/25/22 0102    Specimen: Blood Updated: 07/25/22 0120    Narrative:      The following orders were created for panel order CBC & Differential.  Procedure                               Abnormality         Status                     ---------                               -----------         ------                     CBC Auto Differential[[id number]]        Abnormal            Final result                 Please view results for these tests on the individual orders.    Comprehensive Metabolic Panel [[id number]]  Collected: 07/25/22 0102    Specimen: Blood Updated: 07/25/22 0141     Glucose 94 mg/dL      BUN 11 mg/dL      Creatinine 0.85 mg/dL      Sodium 139 mmol/L      Potassium 4.1 mmol/L      Chloride 104 mmol/L      CO2 24.8 mmol/L      Calcium 9.2 mg/dL      Total Protein 7.5 g/dL      Albumin 4.40 g/dL      ALT (SGPT) 29 U/L      AST (SGOT) 22 U/L      Alkaline Phosphatase 76 U/L      Total Bilirubin 0.3 mg/dL      Globulin 3.1 gm/dL      A/G Ratio 1.4 g/dL      BUN/Creatinine Ratio 12.9     Anion Gap 10.2 mmol/L      eGFR 96.4 mL/min/1.73      Comment: National Kidney Foundation and American Society of Nephrology (ASN) Task Force recommended calculation based on the Chronic Kidney Disease Epidemiology Collaboration (CKD-EPI) equation refit without adjustment for race.       Narrative:      GFR Normal >60  Chronic Kidney Disease <60  Kidney Failure <15      Troponin [[id number]]  (Normal) Collected: 07/25/22 0102    Specimen: Blood Updated: 07/25/22 0136     Troponin T <0.010 ng/mL     Narrative:      Troponin T Reference Range:  <= 0.03 ng/mL-   Negative for AMI  >0.03 ng/mL-     Abnormal for myocardial necrosis.  Clinicians would have to utilize clinical acumen, EKG, Troponin and serial changes to determine if it is an Acute Myocardial Infarction or myocardial injury due to an underlying chronic condition.       Results may be falsely decreased if patient taking Biotin.      hCG, Serum, Qualitative [[id number]]  (Normal) Collected: 07/25/22 0102    Specimen: Blood Updated: 07/25/22 0134     HCG Qualitative Negative    T4, Free [[id number]]  (Normal) Collected: 07/25/22 0102    Specimen: Blood Updated: 07/25/22 0205     Free T4 1.04 ng/dL     Narrative:      Results may be falsely increased if patient taking Biotin.      TSH [[id number]]  (Normal) Collected: 07/25/22 0102    Specimen: Blood Updated: 07/25/22 0205     TSH 1.460 uIU/mL     Magnesium [[id number]]  (Normal) Collected: 07/25/22 0102    Specimen: Blood Updated:  07/25/22 0141     Magnesium 2.2 mg/dL     CBC Auto Differential [[id number]]  (Abnormal) Collected: 07/25/22 0102    Specimen: Blood Updated: 07/25/22 0120     WBC 8.81 10*3/mm3      RBC 4.52 10*6/mm3      Hemoglobin 13.8 g/dL      Hematocrit 40.6 %      MCV 89.8 fL      MCH 30.5 pg      MCHC 34.0 g/dL      RDW 12.2 %      RDW-SD 40.3 fl      MPV 9.1 fL      Platelets 372 10*3/mm3      Neutrophil % 66.2 %      Lymphocyte % 22.7 %      Monocyte % 9.4 %      Eosinophil % 0.9 %      Basophil % 0.6 %      Immature Grans % 0.2 %      Neutrophils, Absolute 5.83 10*3/mm3      Lymphocytes, Absolute 2.00 10*3/mm3      Monocytes, Absolute 0.83 10*3/mm3      Eosinophils, Absolute 0.08 10*3/mm3      Basophils, Absolute 0.05 10*3/mm3      Immature Grans, Absolute 0.02 10*3/mm3      nRBC 0.0 /100 WBC           I ordered the above labs and reviewed the results    RADIOLOGY  XR Chest 1 View   Final Result   No acute findings.       This report was finalized on 7/25/2022 12:22 AM by Dr. Luz Elena Glaser M.D.               I ordered the above noted radiological studies. Interpreted by radiologist.  Reviewed by me in PACS.       PROCEDURES  Procedures  EKG          EKG time: 2342  Rhythm/Rate: NSR, 64  P waves and AZ: nml  QRS, axis: nml, nml   ST and T waves: nml     Interpreted Contemporaneously by me, independently viewed  unchanged compared to prior 8/4/21      PROGRESS AND CONSULTS     The patient was wearing a facemask upon entrance into the room and remained in such throughout their visit.  I was wearing PPE including a facemask, eye protection, as well as gloves at any point entering the room and throughout the visit    0220  On reevaluation, the patient is resting comfortably without acute complaint.  She states that her headache as well as her other symptoms have all fully resolved.  This is most likely result of some anxiety especially since it has significantly improved with her oral propranolol.  I did inform her that  her work-up in the ED is unremarkable and she is stable for discharge.  All questions have been answered.    MEDICAL DECISION MAKING  Results were reviewed/discussed with the patient and they were also made aware of online access. Pt also made aware that some labs, such as cultures, will not be resulted during ER visit and follow up with PMD is necessary.     MDM  Number of Diagnoses or Management Options     Amount and/or Complexity of Data Reviewed  Clinical lab tests: ordered and reviewed  Tests in the medicine section of CPT®: reviewed and ordered  Review and summarize past medical records: yes (Upon medical records review, the patient was last seen and evaluated on 11/8/2020 secondary to COVID-19 with associated hypokalemia)           DIAGNOSIS  Final diagnoses:   Heart palpitations   Anxiety   Acute nonintractable headache, unspecified headache type       DISPOSITION  DISCHARGE    Patient discharged in stable condition.    Reviewed implications of results, diagnosis, meds, responsibility to follow up, warning signs and symptoms of possible worsening, potential complications and reasons to return to ER.    Patient/Family voiced understanding of above instructions.    Discussed plan for discharge, as there is no emergent indication for admission. Patient referred to primary care provider for BP management due to today's BP. Pt/family is agreeable and understands need for follow up and repeat testing.  Pt is aware that discharge does not mean that nothing is wrong but it indicates no emergency is present that requires admission and they must continue care with follow-up as given below or physician of their choice.     FOLLOW-UP  Enzo Sanon MD  [id number]  [facility] [facility] [place] [address] [phone]    Schedule an appointment as soon as possible for a visit            Medication List      Stop    sulfamethoxazole-trimethoprim 800-160 MG per tablet  Commonly known as: Bactrim DS              Latest Documented  Vital Signs:  As of 03:36 EDT  BP- 102/68 HR- 55 Temp- 98.2 °F (36.8 °C) (Tympanic) O2 sat- 100%         Philip Yee MD  07/25/22 0332

## 2022-07-25 NOTE — ED TRIAGE NOTES
To ER via EMS.  Pt was driving home from work.  Started complaining of spasms to bilateral arms and palpitations.  Pt took propranolol with relief of symptoms.  Pt states she is on potassium supplements and has been out of medication.  States symptoms occur when her potassium is low.      Pt in mask at time of triage.  Triage staff in appropriate PPE.

## 2022-07-28 ENCOUNTER — TELEPHONE (OUTPATIENT)
Dept: OBSTETRICS AND GYNECOLOGY | Facility: CLINIC | Age: 27
End: 2022-07-28

## 2022-07-28 RX ORDER — AMOXICILLIN AND CLAVULANATE POTASSIUM 500; 125 MG/1; MG/1
1 TABLET, FILM COATED ORAL 2 TIMES DAILY
Qty: 14 TABLET | Refills: 0 | Status: SHIPPED | OUTPATIENT
Start: 2022-07-28 | End: 2022-08-04

## 2022-07-28 NOTE — TELEPHONE ENCOUNTER
Caller: Caroline Conley     Relationship: SELF    Best call back number: [phone]    What is your medical concern? UTI    PT STATED THEY WERE PRESCRIBED SMZTMPDS FOR A UTI. PT IS ON DAY 4 OF 7 DAYS OF THE MEDICATION AND STATED IT IS MAKING HER VERY SICK TO HER STOMACH.     PT WOULD LIKE TO KNOW IF THERE IS SOMETHING ELSE SHE COULD TAKE TO TREAT THE UTI.

## 2022-07-28 NOTE — TELEPHONE ENCOUNTER
Call to Caroline and left message. I asked Caroline if she can take augmentin, a derivative of penicillin. If so Dr Espinoza said he will send that in to replace the Bactrim that you have been taking for your UTI. You can call us back or send a message straight to Dr Espinoza in My Chart to let us know. We want you to get healed from your UTI. Thank you.

## 2022-07-28 NOTE — TELEPHONE ENCOUNTER
Patient returned called and states she can take the augmentin and would like for it the be sent to the [facility] on [address].     Thank you

## 2022-07-28 NOTE — TELEPHONE ENCOUNTER
Spoke with Caroline to let her know that Dr Espinoza has sent the Rx for augmentin to your [facility] pharmacy. Thank you.

## 2022-07-28 NOTE — TELEPHONE ENCOUNTER
HUB call. Caroline Dx 7/19/22 UTI. You sent in Bactrim for tx to her [facility] pharmacy on file. She is on day 4 of 7 and the bactrim is making her very sick to her stomach. She is requesting something else to treat her uti. Thank you.

## 2022-08-08 ENCOUNTER — TELEPHONE (OUTPATIENT)
Dept: OBSTETRICS AND GYNECOLOGY | Facility: CLINIC | Age: 27
End: 2022-08-08

## 2022-08-08 RX ORDER — FLUCONAZOLE 150 MG/1
150 TABLET ORAL DAILY
Qty: 1 TABLET | Refills: 0 | Status: SHIPPED | OUTPATIENT
Start: 2022-08-08 | End: 2022-08-09

## 2022-08-08 NOTE — TELEPHONE ENCOUNTER
Caller: Yael Caroline    Relationship: Self    Best call back number: [phone]    Requested Prescriptions: PILL FOR YEAST INFECTION CAUSED BY ANTIBIOTICS GIVEN FOR UTI    Requested Prescriptions      No prescriptions requested or ordered in this encounter        Pharm[facility] where request should be sent: [facility]      Additional details provided by patient: PT STATES DR SALVADOR GAVE HER ANTIBIOTICS FOR UTI, NOW SHE HAS A YEAST INFECTION.  LAST TIME THIS HAPPENED  CALLED IN A PILL FOR HER TO TAKE TO RESOLVE - CAN HE CALL HER IN A NEW PRESCRIPTION FOR THIS MEDICINE.    ALL PT WHEN SENT OVER TO PHARMACY    Does the patient have less than a 3 day supply:  [x] Yes  [] No    Reinaldo CENTENO Rep   08/08/22 12:32 EDT

## 2022-08-08 NOTE — TELEPHONE ENCOUNTER
Hi Dr. Espinoza,    Pt is calling to request an rx for a yeast infection. She said that this has happened before from taking antibiotics for a uti. Please advise if this can be filled.    Pharmacy confirmed:   [id number] - [place], [place] - [address] - [phone] [facility] [phone]    [address] [address]

## 2022-09-16 ENCOUNTER — TELEPHONE (OUTPATIENT)
Dept: OBSTETRICS AND GYNECOLOGY | Facility: CLINIC | Age: 27
End: 2022-09-16

## 2022-09-16 DIAGNOSIS — R30.0 DYSURIA: Primary | ICD-10-CM

## 2022-09-16 NOTE — TELEPHONE ENCOUNTER
Patient says she might have a UTI or yeast infection, can I have her come in to leave a urine sample? And if so may you put in the orders for that .    Thank you,  Please advise

## 2022-12-14 ENCOUNTER — TELEPHONE (OUTPATIENT)
Dept: OBSTETRICS AND GYNECOLOGY | Facility: CLINIC | Age: 27
End: 2022-12-14

## 2023-01-18 ENCOUNTER — OFFICE VISIT (OUTPATIENT)
Dept: OBSTETRICS AND GYNECOLOGY | Facility: CLINIC | Age: 28
End: 2023-01-18
Payer: COMMERCIAL

## 2023-01-18 VITALS
SYSTOLIC BLOOD PRESSURE: 105 MMHG | BODY MASS INDEX: 33.13 KG/M2 | DIASTOLIC BLOOD PRESSURE: 71 MMHG | HEIGHT: 62 IN | WEIGHT: 180 LBS

## 2023-01-18 DIAGNOSIS — R10.2 PELVIC PAIN: ICD-10-CM

## 2023-01-18 DIAGNOSIS — N64.4 BREAST PAIN, LEFT: ICD-10-CM

## 2023-01-18 DIAGNOSIS — N89.8 VAGINAL DISCHARGE: Primary | ICD-10-CM

## 2023-01-18 DIAGNOSIS — Z87.448 HISTORY OF HEMATURIA: ICD-10-CM

## 2023-01-18 LAB
APPEARANCE UR: CLEAR
BACTERIA #/AREA URNS HPF: ABNORMAL /HPF
BILIRUB UR QL STRIP: NEGATIVE
CASTS URNS MICRO: ABNORMAL
COLOR UR: YELLOW
EPI CELLS #/AREA URNS HPF: ABNORMAL /HPF
GLUCOSE UR QL STRIP: NEGATIVE
HGB UR QL STRIP: ABNORMAL
KETONES UR QL STRIP: NEGATIVE
LEUKOCYTE ESTERASE UR QL STRIP: NEGATIVE
NITRITE UR QL STRIP: NEGATIVE
PH UR STRIP: 5.5 [PH] (ref 5–8)
PROT UR QL STRIP: NEGATIVE
RBC #/AREA URNS HPF: ABNORMAL /HPF
SP GR UR STRIP: 1.02 (ref 1–1.03)
UROBILINOGEN UR STRIP-MCNC: ABNORMAL MG/DL
WBC #/AREA URNS HPF: ABNORMAL /HPF

## 2023-01-18 PROCEDURE — 99214 OFFICE O/P EST MOD 30 MIN: CPT | Performed by: OBSTETRICS & GYNECOLOGY

## 2023-01-18 RX ORDER — DILTIAZEM HYDROCHLORIDE 60 MG/1
2 TABLET, FILM COATED ORAL 2 TIMES DAILY
COMMUNITY
Start: 2022-12-14

## 2023-01-18 RX ORDER — BISOPROLOL FUMARATE 5 MG/1
TABLET, FILM COATED ORAL
COMMUNITY
Start: 2022-12-02

## 2023-01-18 RX ORDER — SUMATRIPTAN 100 MG/1
100 TABLET, FILM COATED ORAL
COMMUNITY
Start: 2022-08-24

## 2023-01-18 RX ORDER — FLUTICASONE PROPIONATE 50 MCG
SPRAY, SUSPENSION (ML) NASAL
COMMUNITY
Start: 2022-10-25

## 2023-01-18 NOTE — PROGRESS NOTES
Subjective    Chief Complaint   Patient presents with   • Follow-up     Pt states having discharge, pelvic pain, blood in urine, left breast pain       History of Present Illness    Caroline Conley is a 27 y.o. female who presents for vaginal discharge despite trying Diflucan.  Also has had some intermittent left breast pain and occasional left sided pelvic pain since having COVID.  Patient does have an echocardiogram scheduled due to an arrhythmia issue since having COVID.  She uses an IUD as birth control.    Obstetric History:  OB History        1    Para   1    Term   1            AB        Living   1       SAB        IAB        Ectopic        Molar        Multiple   0    Live Births   1               Menstrual History:     No LMP recorded. (Menstrual status: Other).       Past Medical History:   Diagnosis Date   • Abscess of axilla 3/7/2018   • Abscess of face 10/27/2020   • Acute gastritis 2020   • Alcohol abuse 2019    SEVERE DEPENDENCE, IN REMISSION   • Allergic rhinitis    • Anxiety 2019   • ASCUS with positive high risk HPV cervical 2010   • Asthma    • Atrial fibrillation (HCC) 2022   • Atypical chest pain 2019    SEEN AT [facility]   • Blood type A+    • Breast tenderness 2019   • Chest pain 2020   • Constipation 2020   • COVID-19 2021    SEEN AT [facility]   • Decreased libido 2021   • Depression    • DUB (dysfunctional uterine bleeding) 2020    SEEN AT [facility]   • Dysmenorrhea 2020   • Dyspareunia in female 2020   • Epigastric pain 2020    SEEN AT [facility]   • GERD (gastroesophageal reflux disease)    • Hair loss 2022   • Hematochezia 2020   • Hematuria 2021   • Herpes     last outbreak , no current outbrek, FOB does not know   • Hyperglycemia 2021   • Hyperlipidemia 2020   • Hypokalemia 2021   • Irregular heart rhythm 2022   • Irregular menses 2019   • Leiomyoma 2019    SEEN  "AT [facility]   • Migraine    • Mononucleosis 07/2020   • Palpitations 08/08/2020    SEEN AT [facility]   • Panic attack    • Panic disorder (episodic paroxysmal anxiety) 2/11/2019   • Paresthesias 08/04/2021    SEEN AT [facility]   • Peripheral neuropathy 02/23/2021    SEEN AT [facility]   • PMS (premenstrual syndrome)    • Polyosteoarthritis    • Polysubstance abuse (HCC) 7/12/2019    USED METH, IN REMISSION   • Raynaud's disease 6/28/2021   • Rectal bleeding 11/2021   • Seasonal allergies    • Seizure-like activity (HCC) 04/18/2021    SEEN AT [facility]   • Sinus tachycardia 06/29/2019    SEEN AT [facility]   • Spells of decreased attentiveness 05/16/2021    ADMITTED TO [place]   • Syncope and collapse 08/09/2020    ADMITTED TO [place]   • Tachycardia 5/21/2021   • Vitamin B 12 deficiency    • Vitamin D deficiency      Family History   Problem Relation Age of Onset   • Thyroid disease Mother    • IVON disease Mother    • Alcohol abuse Mother    • Bipolar disorder Mother    • Mental illness Mother    • Alcohol abuse Father    • Alcohol abuse Maternal Grandmother    • Hypertension Maternal Grandmother    • Mental illness Maternal Grandmother    • Mental illness Maternal Aunt        The following portions of the patient's history were reviewed and updated as appropriate: allergies, current medications, past family history, past medical history, past social history, past surgical history and problem list.    Review of Systems  As per HPI       Objective   Physical Exam  Examination of both breasts unremarkable for any masses, nipple discharge, or significant tenderness.  Vagina demonstrates a white discharge.  Culture performed.  Cervix without lesion.  Bimanual exam negative for any unusual tenderness or masses.  /71   Ht 157.5 cm (62\")   Wt 81.6 kg (180 lb)   BMI 32.92 kg/m²     Assessment & Plan   Diagnoses and all orders for this visit:    1. Vaginal discharge (Primary)  -     NuSwab VG+ - Swab, Vagina    2. Breast " pain, left    3. Pelvic pain  -     US Non-ob Transvaginal; Future    4. History of hematuria  -     Urinalysis With Microscopic - Urine, Clean Catch  -     Urine Culture - Urine, Urine, Clean Catch        Impression and plan.  Since breast exam negative, discussed decreasing caffeine and trying 400 IU vitamin D daily.  We will do a repeat breast check in 6 weeks when her annual exam is due with her Pap.  We will also perform an ultrasound at that time just to check her left adnexa due to her intermittent pain.  Call 1 week for results of vaginal discharge culture.   Patient making an appointment in 6 weeks for annual exam

## 2023-01-20 LAB
A VAGINAE DNA VAG QL NAA+PROBE: NORMAL SCORE
BACTERIA UR CULT: NORMAL
BACTERIA UR CULT: NORMAL
BVAB2 DNA VAG QL NAA+PROBE: NORMAL SCORE
C ALBICANS DNA VAG QL NAA+PROBE: NEGATIVE
C GLABRATA DNA VAG QL NAA+PROBE: NEGATIVE
C TRACH DNA VAG QL NAA+PROBE: NEGATIVE
MEGA1 DNA VAG QL NAA+PROBE: NORMAL SCORE
N GONORRHOEA DNA VAG QL NAA+PROBE: NEGATIVE
T VAGINALIS DNA VAG QL NAA+PROBE: NEGATIVE

## 2023-01-23 ENCOUNTER — TELEPHONE (OUTPATIENT)
Dept: OBSTETRICS AND GYNECOLOGY | Facility: CLINIC | Age: 28
End: 2023-01-23
Payer: COMMERCIAL

## 2023-01-23 NOTE — PROGRESS NOTES
Please tell patient that both her vaginal culture and her urine culture are totally negative for any infection.  Thank you.

## 2023-01-23 NOTE — TELEPHONE ENCOUNTER
----- Message from Axel Espinoza MD sent at 1/23/2023  1:09 PM EST -----  Please tell patient that both her vaginal culture and her urine culture are totally negative for any infection.  Thank you.

## 2023-03-13 ENCOUNTER — TELEPHONE (OUTPATIENT)
Dept: OBSTETRICS AND GYNECOLOGY | Facility: CLINIC | Age: 28
End: 2023-03-13
Payer: COMMERCIAL

## 2023-04-12 ENCOUNTER — OFFICE VISIT (OUTPATIENT)
Dept: OBSTETRICS AND GYNECOLOGY | Facility: CLINIC | Age: 28
End: 2023-04-12
Payer: COMMERCIAL

## 2023-04-12 VITALS
BODY MASS INDEX: 32.94 KG/M2 | WEIGHT: 179 LBS | DIASTOLIC BLOOD PRESSURE: 70 MMHG | HEIGHT: 62 IN | SYSTOLIC BLOOD PRESSURE: 102 MMHG

## 2023-04-12 DIAGNOSIS — Z01.419 ENCOUNTER FOR GYNECOLOGICAL EXAMINATION WITHOUT ABNORMAL FINDING: Primary | ICD-10-CM

## 2023-04-12 NOTE — PROGRESS NOTES
Subjective    Chief Complaint   Patient presents with   • Gynecologic Exam     AE, Discuss u/s       History of Present Illness    Caroline Conley is a 27 y.o. female who presents for annual exam.  Non-smoker.  Totally normal ultrasound today which was performed for intermittent left lower quadrant pain.  IUD in place with normal ovaries and 5 mm stripe.  Normal vaginal and urine culture 3 months ago with no evidence of STDs or infection in general.  Patient having no complaints of pain etc. today.  Considering LTC tubal ligation.  Has 1 child not wanting anymore.    Obstetric History:  OB History        1    Para   1    Term   1            AB        Living   1       SAB        IAB        Ectopic        Molar        Multiple   0    Live Births   1               Menstrual History:     No LMP recorded. (Menstrual status: Other).       Past Medical History:   Diagnosis Date   • Abscess of axilla 3/7/2018   • Abscess of face 10/27/2020   • Acute gastritis 2020   • Alcohol abuse 2019    SEVERE DEPENDENCE, IN REMISSION   • Allergic rhinitis    • Anxiety 2019   • ASCUS with positive high risk HPV cervical 2010   • Asthma    • Atrial fibrillation 2022   • Atypical chest pain 2019    SEEN AT [facility]   • Blood type A+    • Breast tenderness 2019   • Chest pain 2020   • Constipation 2020   • COVID-19 2021    SEEN AT [facility]   • Decreased libido 2021   • Depression    • DUB (dysfunctional uterine bleeding) 2020    SEEN AT [facility]   • Dysmenorrhea 2020   • Dyspareunia in female 2020   • Epigastric pain 2020    SEEN AT [facility]   • GERD (gastroesophageal reflux disease)    • Hair loss 2022   • Hematochezia 2020   • Hematuria 2021   • Herpes     last outbreak , no current outbrek, FOB does not know   • Hyperglycemia 2021   • Hyperlipidemia 2020   • Hypokalemia 2021   • Irregular heart rhythm 2022   •  Irregular menses 12/2019   • Leiomyoma 04/16/2019    SEEN AT [facility]   • Migraine    • Mononucleosis 07/2020   • Palpitations 08/08/2020    SEEN AT [facility]   • Panic attack    • Panic disorder (episodic paroxysmal anxiety) 2/11/2019   • Paresthesias 08/04/2021    SEEN AT [facility]   • Peripheral neuropathy 02/23/2021    SEEN AT [facility]   • PMS (premenstrual syndrome)    • Polyosteoarthritis    • Polysubstance abuse 7/12/2019    USED METH, IN REMISSION   • Raynaud's disease 6/28/2021   • Rectal bleeding 11/2021   • Seasonal allergies    • Seizure-like activity 04/18/2021    SEEN AT [facility]   • Sinus tachycardia 06/29/2019    SEEN AT [facility]   • Spells of decreased attentiveness 05/16/2021    ADMITTED TO [place]   • Syncope and collapse 08/09/2020    ADMITTED TO [place]   • Tachycardia 5/21/2021   • Vitamin B 12 deficiency    • Vitamin D deficiency      Family History   Problem Relation Age of Onset   • Thyroid disease Mother    • IVON disease Mother    • Alcohol abuse Mother    • Bipolar disorder Mother    • Mental illness Mother    • Alcohol abuse Father    • Alcohol abuse Maternal Grandmother    • Hypertension Maternal Grandmother    • Mental illness Maternal Grandmother    • Mental illness Maternal Aunt      Social History     Tobacco Use   Smoking Status Never   Smokeless Tobacco Never         The following portions of the patient's history were reviewed and updated as appropriate: allergies, current medications, past family history, past medical history, past social history, past surgical history and problem list.    Review of Systems   Constitutional: Negative.  Negative for fever and unexpected weight change.   HENT: Negative.    Respiratory: Negative for shortness of breath and wheezing.    Cardiovascular: Negative for chest pain, palpitations and leg swelling.   Gastrointestinal: Negative for abdominal pain, anal bleeding and blood in stool.   Genitourinary: Negative for dysuria, pelvic pain, urgency,  vaginal bleeding, vaginal discharge and vaginal pain.   Skin: Negative.    Neurological: Negative.    Hematological: Negative.  Negative for adenopathy.   Psychiatric/Behavioral: Negative.  Negative for dysphoric mood. The patient is not nervous/anxious.             Objective   Physical Exam  Exam conducted with a chaperone present.   Constitutional:       Appearance: Normal appearance. She is well-developed.   HENT:      Head: Normocephalic.   Neck:      Thyroid: No thyromegaly.      Trachea: Trachea normal. No tracheal deviation.   Cardiovascular:      Rate and Rhythm: Normal rate and regular rhythm.      Heart sounds: Normal heart sounds. No murmur heard.  Pulmonary:      Effort: Pulmonary effort is normal.      Breath sounds: Normal breath sounds.   Chest:   Breasts:     Right: Normal. No mass, nipple discharge or tenderness.      Left: Normal. No mass, nipple discharge or tenderness.   Abdominal:      Palpations: Abdomen is soft. There is no mass.      Tenderness: There is no abdominal tenderness.      Hernia: No hernia is present.   Genitourinary:     General: Normal vulva.      Labia:         Right: No tenderness or lesion.         Left: No tenderness or lesion.       Urethra: No prolapse or urethral lesion.      Vagina: Normal. No vaginal discharge or lesions.      Cervix: No cervical motion tenderness.      Uterus: Not enlarged and not tender.       Adnexa:         Right: No mass or tenderness.          Left: No mass or tenderness.        Rectum: Normal. No external hemorrhoid or internal hemorrhoid. Normal anal tone.      Comments: External genitalia normal   Lymphadenopathy:      Cervical: No cervical adenopathy.      Upper Body:      Right upper body: No axillary adenopathy.      Left upper body: No axillary adenopathy.   Skin:     General: Skin is warm and dry.      Findings: No rash.   Neurological:      Mental Status: She is alert and oriented to person, place, and time.   Psychiatric:          "Behavior: Behavior normal.         /70   Ht 157.5 cm (62\")   Wt 81.2 kg (179 lb)   BMI 32.74 kg/m²     Assessment & Plan   Diagnoses and all orders for this visit:    1. Encounter for gynecological examination without abnormal finding (Primary)  -     IGP,rfx Aptima HPV All Pth        Return to office 1 year.  Counseled about contraception including long-term contraception with her current IUD versus permanent contraception with tubal ligation.  Explained the difference between LTC tubal and bilateral salpingectomy.  Patient will call if she desires to have surgery scheduled and knows she has to come in a month in advance to sign tubal papers.           "

## 2023-05-13 ENCOUNTER — APPOINTMENT (OUTPATIENT)
Dept: GENERAL RADIOLOGY | Facility: HOSPITAL | Age: 28
End: 2023-05-13
Payer: COMMERCIAL

## 2023-05-13 ENCOUNTER — HOSPITAL ENCOUNTER (EMERGENCY)
Facility: HOSPITAL | Age: 28
Discharge: HOME | End: 2023-05-13
Attending: EMERGENCY MEDICINE
Payer: COMMERCIAL

## 2023-05-13 VITALS
DIASTOLIC BLOOD PRESSURE: 76 MMHG | WEIGHT: 185 LBS | SYSTOLIC BLOOD PRESSURE: 116 MMHG | BODY MASS INDEX: 34.04 KG/M2 | RESPIRATION RATE: 20 BRPM | OXYGEN SATURATION: 98 % | HEIGHT: 62 IN | TEMPERATURE: 97.8 F | HEART RATE: 80 BPM

## 2023-05-13 DIAGNOSIS — R31.29 MICROSCOPIC HEMATURIA: ICD-10-CM

## 2023-05-13 DIAGNOSIS — K59.00 CONSTIPATION, UNSPECIFIED CONSTIPATION TYPE: Primary | ICD-10-CM

## 2023-05-13 LAB
B-HCG UR QL: NEGATIVE
BACTERIA UR QL AUTO: ABNORMAL /HPF
BILIRUB UR QL STRIP: NEGATIVE
CLARITY UR: CLEAR
COLOR UR: YELLOW
GLUCOSE UR STRIP-MCNC: NEGATIVE MG/DL
HGB UR QL STRIP.AUTO: ABNORMAL
HYALINE CASTS UR QL AUTO: ABNORMAL /LPF
KETONES UR QL STRIP: NEGATIVE
LEUKOCYTE ESTERASE UR QL STRIP.AUTO: NEGATIVE
NITRITE UR QL STRIP: NEGATIVE
PH UR STRIP.AUTO: 7 [PH] (ref 5–8)
PROT UR QL STRIP: NEGATIVE
RBC # UR STRIP: ABNORMAL /HPF
REF LAB TEST METHOD: ABNORMAL
SP GR UR STRIP: 1.02 (ref 1–1.03)
SQUAMOUS #/AREA URNS HPF: ABNORMAL /HPF
UROBILINOGEN UR QL STRIP: ABNORMAL
WBC # UR STRIP: ABNORMAL /HPF

## 2023-05-13 PROCEDURE — 81025 URINE PREGNANCY TEST: CPT | Performed by: PHYSICIAN ASSISTANT

## 2023-05-13 PROCEDURE — 81001 URINALYSIS AUTO W/SCOPE: CPT | Performed by: PHYSICIAN ASSISTANT

## 2023-05-13 PROCEDURE — 74018 RADEX ABDOMEN 1 VIEW: CPT

## 2023-05-13 PROCEDURE — 99283 EMERGENCY DEPT VISIT LOW MDM: CPT

## 2023-05-13 NOTE — ED NOTES
Pt to ER for UTI symptoms. Pt recently went to the hospital and was diagnosed with constipation, UTI, and hiatal hernia. Pt said she has been taking macrobid for the UTI with no relief.    This RN in appropriate PPE while in pt room. Pt wearing mask

## 2023-05-13 NOTE — ED PROVIDER NOTES
EMERGENCY DEPARTMENT ENCOUNTER    Room Number:  04/04  Date of encounter:  5/13/2023  PCP: Oscar Dowling MD  Patient Care Team:  Oscar Dowling MD as PCP - General (Family Medicine)  Darrian Barron MD as Consulting Physician (Gastroenterology)  Yadiel Tao MD as Consulting Physician (Cardiology)  Axel Espinoza MD as Consulting Physician (Obstetrics and Gynecology)   Independent Historians: Patient    HPI:  Chief Complaint: Urinary frequency, hematuria  A complete HPI/ROS/PMH/PSH/SH/FH are unobtainable due to: None    Chronic or social conditions impacting patient care (social determinants of health): None    Context: Caroline Conley is a 27 y.o. female who presents to the ED c/o persistent urinary frequency and lower abdominal discomfort since being diagnosed with a UTI a couple weeks ago.  She was started on Macrobid but states she never improved even after completing this.  She was also diagnosed with a hiatal hernia and constipation.  She states her last bowel movement was probably 5 days ago.  She denies any nausea or vomiting, fevers or chills.  She has been taking MiraLAX.  She states she has contacted urology but is unable to get in for appointment for several months.    Review of prior external notes (non-ED): Reviewed outside ER encounter at [facility] facility with Dr. Schrader on 4/28/2023.  Patient was discharged with diagnosis of acute lower urinary tract infection.  Patient given prescription for Macrobid,.  IM, and Zofran.  Referred to urology.    Review of prior external test results outside of this encounter: Urinalysis on 4/28/2023 showed 2-5 RBCs, 20-50 WBCs, 1+ bacteria, 10-20 epithelial cells.  Urine culture unavailable for review.  CBC from same date shows normal WBC.  CMP shows normal renal function.    PAST MEDICAL HISTORY  Active Ambulatory Problems     Diagnosis Date Noted   • Herpes simplex 02/21/2017   • Abdominal pain 08/09/2020   • Chest pain 08/09/2020   • Abscess of axilla  03/07/2018   • Acute gastritis 05/21/2020   • Acute upper respiratory infection 08/21/2017   • Alcohol abuse 07/12/2019   • Allergic rhinitis 08/21/2017   • Anxiety 07/12/2019   • Atrial fibrillation 01/28/2022   • Attention disturbance 05/16/2021   • Spells of decreased attentiveness 05/16/2021   • Constipation 07/14/2020   • Cough 04/05/2018   • Depressive disorder 09/12/2019   • Difficult or painful urination 09/04/2018   • Exposure to Streptococcal pharyngitis 01/06/2020   • Fatigue 04/30/2020   • Gastro-esophageal reflux disease without esophagitis 08/21/2017   • Hematochezia 08/09/2020   • Hyperlipidemia 04/30/2020   • Irregular heart rhythm 01/28/2022   • Migraine 04/30/2020   • Muscle pain 08/06/2020   • Neurological finding 07/12/2019   • Seizure-like activity 07/12/2019   • Nondependent alcohol abuse, in remission 06/28/2021   • Otalgia 02/11/2019   • Palpitations 08/09/2020   • Panic disorder (episodic paroxysmal anxiety) 02/11/2019   • Polysubstance abuse 07/12/2019   • Pulmonary congestion 04/05/2018   • Rash 05/12/2020   • Raynaud's disease 06/28/2021   • Substance abuse in remission 06/28/2021   • Syncope 08/09/2020   • Tachycardia 05/21/2021   • Tingling of skin 04/20/2021   • Uses contraception 11/02/2017   • Vaginal discharge 10/23/2019     Resolved Ambulatory Problems     Diagnosis Date Noted   • Pregnancy 02/21/2017   • Pregnant 03/02/2017   • Normal labor 03/02/2017   • Arrested active phase of labor 03/02/2017     Past Medical History:   Diagnosis Date   • Abscess of face 10/27/2020   • ASCUS with positive high risk HPV cervical 11/29/2010   • Asthma    • Atypical chest pain 04/20/2019   • Blood type A+    • Breast tenderness 12/2019   • COVID-19 11/08/2021   • Decreased libido 08/2021   • Depression    • DUB (dysfunctional uterine bleeding) 01/11/2020   • Dysmenorrhea 01/2020   • Dyspareunia in female 11/2020   • Epigastric pain 08/06/2020   • GERD (gastroesophageal reflux disease)    • Hair  loss 2022   • Hematuria 2021   • Herpes    • Hyperglycemia 2021   • Hypokalemia 2021   • Irregular menses 2019   • Leiomyoma 2019   • Mononucleosis 2020   • Panic attack    • Paresthesias 2021   • Peripheral neuropathy 2021   • PMS (premenstrual syndrome)    • Polyosteoarthritis    • Rectal bleeding 2021   • Seasonal allergies    • Sinus tachycardia 2019   • Syncope and collapse 2020   • Vitamin B 12 deficiency    • Vitamin D deficiency        The patient has started, but not completed, their COVID-19 vaccination series.    PAST SURGICAL HISTORY  Past Surgical History:   Procedure Laterality Date   •  SECTION N/A 3/2/2017    Procedure:  SECTION PRIMARY;  Surgeon: Panda Skelton MD;  Location: [facility] DELIVERY;  Service:    • EAR TUBES Bilateral    • ENDOSCOPY AND COLONOSCOPY N/A 2020    BOTH SCOPES WNL, NO SPECIMENS COLLECTED, DR. LOU LINDER AT [place]   • INTRAUTERINE DEVICE INSERTION N/A 2020    DR. SIENNA KEEN   • TONSILLECTOMY AND ADENOIDECTOMY Bilateral    • WISDOM TOOTH EXTRACTION Bilateral          FAMILY HISTORY  Family History   Problem Relation Age of Onset   • Thyroid disease Mother    • IVON disease Mother    • Alcohol abuse Mother    • Bipolar disorder Mother    • Mental illness Mother    • Alcohol abuse Father    • Alcohol abuse Maternal Grandmother    • Hypertension Maternal Grandmother    • Mental illness Maternal Grandmother    • Mental illness Maternal Aunt          SOCIAL HISTORY  Social History     Socioeconomic History   • Marital status: Single   Tobacco Use   • Smoking status: Never   • Smokeless tobacco: Never   Substance and Sexual Activity   • Alcohol use: Not Currently   • Drug use: No     Comment: Sober x 2 years - used to do meth   • Sexual activity: Yes     Partners: Male     Birth control/protection: I.U.D.         ALLERGIES  Cefdinir, Cephalosporins, and Venlafaxine        REVIEW OF  SYSTEMS  Review of Systems   ***  All systems reviewed and negative except for those discussed in HPI.       PHYSICAL EXAM    I have reviewed the triage vital signs and nursing notes.    ED Triage Vitals [05/13/23 1735]   Temp Heart Rate Resp BP SpO2   97.8 °F (36.6 °C) 80 20 116/76 98 %      Temp src Heart Rate Source Patient Position BP Location FiO2 (%)   Tympanic Monitor Standing Right arm --       Physical Exam  GENERAL: alert, well-appearing, nontoxic, no acute distress  SKIN: Warm, dry  HENT: Normocephalic, atraumatic  EYES: no scleral icterus  CV: regular rhythm, regular rate  RESPIRATORY: normal effort, lungs clear  ABDOMEN: soft, mild suprapubic discomfort to palpation without focal tenderness, nondistended, no CVA tenderness  MUSCULOSKELETAL: no deformity  NEURO: alert, moves all extremities, follows commands          LAB RESULTS  No results found for this or any previous visit (from the past 24 hour(s)).    Ordered the above labs and independently reviewed and interpreted the results.        RADIOLOGY  No Radiology Exams Resulted Within Past 24 Hours    I ordered the above noted radiological studies. Independently reviewed and interpreted by me.  See dictation for official radiology interpretation.      PROCEDURES    Procedures      MEDICATIONS GIVEN IN ER    Medications - No data to display      PROGRESS, DATA ANALYSIS, CONSULTS, AND MEDICAL DECISION MAKING    All labs have been independently reviewed and interpreted by me.  All radiology studies have been independently reviewed and interpreted by me and discussed with radiologist dictating the report.   EKG's independently reviewed and interpreted by me.  Discussion below represents my analysis of pertinent findings related to patient's condition, differential diagnosis, treatment plan and final disposition.    Differential diagnosis***             PPE: Patient was placed in face mask in first look. Patient was wearing facemask when I entered the room  and throughout our encounter. I wore full protective equipment throughout this patient encounter including a face mask, and gloves. Hand hygiene was performed before donning protective equipment and after removal when leaving the room.        AS OF 17:56 EDT VITALS:    BP - 116/76  HR - 80  TEMP - 97.8 °F (36.6 °C) (Tympanic)  O2 SATS - 98%        DIAGNOSIS  Final diagnoses:   None         DISPOSITION  ED Disposition     None             Note Disclaimer: At [facility], we believe that sharing information builds trust and better relationships. You are receiving this note because you recently visited [facility]. It is possible you will see health information before a provider has talked with you about it. This kind of information can be easy to misunderstand. To help you fully understand what it means for your health, we urge you to discuss this note with your provider.     receiving this note because you recently visited [facility]. It is possible you will see health information before a provider has talked with you about it. This kind of information can be easy to misunderstand. To help you fully understand what it means for your health, we urge you to discuss this note with your provider.       Rika White PA  05/17/23 0011

## 2023-05-18 NOTE — ED PROVIDER NOTES
MD ATTESTATION NOTE    The CHE[facility] and I have discussed this patient's history, physical exam, and treatment plan.    I provided a substantive portion of the care of this patient. I personally performed the physical exam, in its entirety. The attached note describes my personal findings.      Caroline Conley is a 27 y.o. female who presents to the ED c/o urinary frequency and hematuria.  She reports having associated lower abdominal discomfort.      On exam:  GENERAL: not distressed  HENT: nares patent  EYES: no scleral icterus  CV: regular rhythm, regular rate  RESPIRATORY: normal effort  ABDOMEN: soft, surgical tenderness without rebound or guarding, no CVA tenderness  MUSCULOSKELETAL: no deformity  NEURO: alert, moves all extremities, follows commands  SKIN: warm, dry    Labs  No results found for this or any previous visit (from the past 24 hour(s)).    Radiology  No Radiology Exams Resulted Within Past 24 Hours    Medications given in the ED:  Medications - No data to display    Orders placed during this visit:  Orders Placed This Encounter   Procedures   • XR Abdomen KUB   • Urinalysis With Microscopic If Indicated (No Culture) - Urine, Clean Catch   • Pregnancy, Urine - Urine, Clean Catch   • Urinalysis, Microscopic Only - Urine, Clean Catch       Medical Decision Making:  ED Course as of 05/17/23 2252   Sat May 13, 2023   1858 Leukocytes, UA: Negative [TD]   1858 Nitrite, UA: Negative [TD]   1858 WBC, UA: 0-2 [TD]   1858 Bacteria, UA: None Seen [TD]   1858 HCG, Urine QL: Negative [TD]   1910 XR Abdomen KUB  Radiology study independently interpreted by me and my findings are no obstruction, no free air.   [DC]      ED Course User Index  [DC] Rika White PA  [TD] Hipolito Pedro II, MD           PPE: Both the patient and I wore a surgical mask throughout the entire patient encounter.     Diagnosis  Final diagnoses:   Constipation, unspecified constipation type   Microscopic hematuria        Hipolito Pedro  Peterson GEE MD  05/17/23 5471

## 2023-09-08 ENCOUNTER — TELEPHONE (OUTPATIENT)
Dept: OBSTETRICS AND GYNECOLOGY | Facility: CLINIC | Age: 28
End: 2023-09-08
Payer: COMMERCIAL

## 2023-11-13 ENCOUNTER — TELEPHONE (OUTPATIENT)
Dept: OBSTETRICS AND GYNECOLOGY | Facility: CLINIC | Age: 28
End: 2023-11-13
Payer: COMMERCIAL

## 2023-11-22 ENCOUNTER — OFFICE VISIT (OUTPATIENT)
Dept: OBSTETRICS AND GYNECOLOGY | Facility: CLINIC | Age: 28
End: 2023-11-22
Payer: COMMERCIAL

## 2023-11-22 VITALS
SYSTOLIC BLOOD PRESSURE: 100 MMHG | HEIGHT: 62 IN | DIASTOLIC BLOOD PRESSURE: 71 MMHG | WEIGHT: 195 LBS | BODY MASS INDEX: 35.88 KG/M2

## 2023-11-22 DIAGNOSIS — N89.8 VAGINAL DISCHARGE: Primary | ICD-10-CM

## 2023-11-22 DIAGNOSIS — R39.9 UTI SYMPTOMS: ICD-10-CM

## 2023-11-22 RX ORDER — METRONIDAZOLE 500 MG/1
500 TABLET ORAL 3 TIMES DAILY
Qty: 15 TABLET | Refills: 0 | Status: SHIPPED | OUTPATIENT
Start: 2023-11-22 | End: 2023-11-27

## 2023-11-22 NOTE — PROGRESS NOTES
Subjective    Chief Complaint   Patient presents with    Follow-up     Pt states having vaginal discharge and odor, would like to be tested for UTI       History of Present Illness    Caroline Conley is a 28 y.o. female who presents for vaginal discharge and odor.  Patient states she has to change her underwear several times a day.  She says her discharge has a strong ammonia smell.  Also having some irritation with urination.  Would like a urine culture performed.  Pap smear not due until April.    Obstetric History:  OB History          1    Para   1    Term   1            AB        Living   1         SAB        IAB        Ectopic        Molar        Multiple   0    Live Births   1               Menstrual History:     No LMP recorded. (Menstrual status: Other).       Past Medical History:   Diagnosis Date    Abscess of axilla 3/7/2018    Abscess of face 10/27/2020    Acute gastritis 2020    Alcohol abuse 2019    SEVERE DEPENDENCE, IN REMISSION    Allergic rhinitis     Anxiety 2019    ASCUS with positive high risk HPV cervical 2010    Asthma     Atrial fibrillation 2022    Atypical chest pain 2019    SEEN AT [facility]    Blood type A+     Breast tenderness 2019    Chest pain 2020    Constipation 2020    COVID-19 2021    SEEN AT [facility]    Decreased libido 2021    Depression     DUB (dysfunctional uterine bleeding) 2020    SEEN AT [facility]    Dysmenorrhea 2020    Dyspareunia in female 2020    Epigastric pain 2020    SEEN AT [facility]    GERD (gastroesophageal reflux disease)     Hair loss 2022    Hematochezia 2020    Hematuria 2021    Herpes     last outbreak , no current outbrek, FOB does not know    Hyperglycemia 2021    Hyperlipidemia 2020    Hypokalemia 2021    Irregular heart rhythm 2022    Irregular menses 2019    Leiomyoma 2019    SEEN AT [facility]    Migraine     Mononucleosis  "07/2020    Palpitations 08/08/2020    SEEN AT [facility]    Panic attack     Panic disorder (episodic paroxysmal anxiety) 2/11/2019    Paresthesias 08/04/2021    SEEN AT [facility]    Peripheral neuropathy 02/23/2021    SEEN AT [facility]    PMS (premenstrual syndrome)     Polyosteoarthritis     Polysubstance abuse 7/12/2019    USED METH, IN REMISSION    Raynaud's disease 6/28/2021    Rectal bleeding 11/2021    Seasonal allergies     Seizure-like activity 04/18/2021    SEEN AT [facility]    Sinus tachycardia 06/29/2019    SEEN AT [facility]    Spells of decreased attentiveness 05/16/2021    ADMITTED TO [place]    Syncope and collapse 08/09/2020    ADMITTED TO [place]    Tachycardia 5/21/2021    Vitamin B 12 deficiency     Vitamin D deficiency      Family History   Problem Relation Age of Onset    Thyroid disease Mother     IVON disease Mother     Alcohol abuse Mother     Bipolar disorder Mother     Mental illness Mother     Alcohol abuse Father     Alcohol abuse Maternal Grandmother     Hypertension Maternal Grandmother     Mental illness Maternal Grandmother     Mental illness Maternal Aunt        The following portions of the patient's history were reviewed and updated as appropriate: allergies, current medications, past family history, past medical history, past social history, past surgical history, and problem list.    Review of Systems  As per HPI       Objective   Physical Exam  Copious yellow-white discharge consistent with probable BV.  Vaginal culture performed.  Bimanual exam negative.  /71   Ht 157.5 cm (62\")   Wt 88.5 kg (195 lb)   BMI 35.67 kg/m²     Assessment & Plan   Diagnoses and all orders for this visit:    1. Vaginal discharge (Primary)  -     NuSwab VG+ - Swab, Vagina    2. UTI symptoms  -     Urinalysis With Microscopic - Urine, Clean Catch  -     Urine Culture - Urine, Urine, Clean Catch    Other orders  -     metroNIDAZOLE (Flagyl) 500 MG tablet; Take 1 tablet by mouth 3 (Three) Times a Day " for 5 days.  Dispense: 15 tablet; Refill: 0        Call 1 week for results of labs.

## 2023-11-23 LAB
APPEARANCE UR: ABNORMAL
BACTERIA #/AREA URNS HPF: ABNORMAL /[HPF]
BILIRUB UR QL STRIP: NEGATIVE
CASTS URNS QL MICRO: ABNORMAL /LPF
COLOR UR: YELLOW
EPI CELLS #/AREA URNS HPF: >10 /HPF (ref 0–10)
GLUCOSE UR QL STRIP: NEGATIVE
HGB UR QL STRIP: ABNORMAL
KETONES UR QL STRIP: NEGATIVE
LEUKOCYTE ESTERASE UR QL STRIP: ABNORMAL
MICRO URNS: ABNORMAL
NITRITE UR QL STRIP: NEGATIVE
PH UR STRIP: 5.5 [PH] (ref 5–7.5)
PROT UR QL STRIP: NEGATIVE
RBC #/AREA URNS HPF: ABNORMAL /HPF (ref 0–2)
SP GR UR STRIP: 1.02 (ref 1–1.03)
UROBILINOGEN UR STRIP-MCNC: 0.2 MG/DL (ref 0.2–1)
WBC #/AREA URNS HPF: >30 /HPF (ref 0–5)

## 2023-11-26 LAB
A VAGINAE DNA VAG QL NAA+PROBE: ABNORMAL SCORE
BACTERIA UR CULT: ABNORMAL
BACTERIA UR CULT: ABNORMAL
BVAB2 DNA VAG QL NAA+PROBE: ABNORMAL SCORE
C ALBICANS DNA VAG QL NAA+PROBE: NEGATIVE
C GLABRATA DNA VAG QL NAA+PROBE: NEGATIVE
C TRACH DNA VAG QL NAA+PROBE: NEGATIVE
MEGA1 DNA VAG QL NAA+PROBE: ABNORMAL SCORE
N GONORRHOEA DNA VAG QL NAA+PROBE: NEGATIVE
OTHER ANTIBIOTIC SUSC ISLT: ABNORMAL
T VAGINALIS DNA VAG QL NAA+PROBE: NEGATIVE

## 2023-11-27 ENCOUNTER — TELEPHONE (OUTPATIENT)
Dept: OBSTETRICS AND GYNECOLOGY | Facility: CLINIC | Age: 28
End: 2023-11-27
Payer: COMMERCIAL

## 2023-11-27 RX ORDER — NITROFURANTOIN 25; 75 MG/1; MG/1
100 CAPSULE ORAL 2 TIMES DAILY
Qty: 14 CAPSULE | Refills: 0 | Status: SHIPPED | OUTPATIENT
Start: 2023-11-27 | End: 2023-12-04

## 2023-11-27 NOTE — PROGRESS NOTES
Please tell patient that she had both a UTI and BV.  The Flagyl I already gave her should take care of her bacterial vaginosis.  I have sent Macrobid to her pharmacy which should take care of her UTI.  Thank you.

## 2023-11-27 NOTE — TELEPHONE ENCOUNTER
----- Message from Axel Espinoza MD sent at 11/27/2023  7:39 AM EST -----  Please tell patient that she had both a UTI and BV.  The Flagyl I already gave her should take care of her bacterial vaginosis.  I have sent Macrobid to her pharmacy which should take care of her UTI.  Thank you.

## 2024-01-08 ENCOUNTER — TELEPHONE (OUTPATIENT)
Dept: OBSTETRICS AND GYNECOLOGY | Facility: CLINIC | Age: 29
End: 2024-01-08
Payer: COMMERCIAL

## 2024-01-12 ENCOUNTER — TELEPHONE (OUTPATIENT)
Dept: OBSTETRICS AND GYNECOLOGY | Facility: CLINIC | Age: 29
End: 2024-01-12
Payer: COMMERCIAL

## 2024-02-16 ENCOUNTER — TELEPHONE (OUTPATIENT)
Dept: OBSTETRICS AND GYNECOLOGY | Facility: CLINIC | Age: 29
End: 2024-02-16
Payer: COMMERCIAL

## 2024-07-03 ENCOUNTER — TELEPHONE (OUTPATIENT)
Dept: OBSTETRICS AND GYNECOLOGY | Facility: CLINIC | Age: 29
End: 2024-07-03

## 2024-07-03 NOTE — TELEPHONE ENCOUNTER
Caller: Royce [place]    Relationship: Self    Best call back number: [phone] (home)       What form or medical record are you requesting: ALL RECORDS    Who is requesting this form or medical record from you: [facility]  [address] [place], [place] [id number] [phone]    How would you like to receive the form or medical records (pick-up, mail, fax): EMAIL, IF POSSIBLE  [email or web address]      Timeframe paperwork needed: NO SPECIFIC TIME FRAME    Additional notes: [facility], MOVED TO [place] AND NEEDS RECORDS FAXED TO NEW PROVIDER.

## 2025-02-14 NOTE — TELEPHONE ENCOUNTER
Can you call patient
Patient has been scheduled.  
Penta patient
Provider: DR. SALVADOR   Caller: ARMANDO HARRISON   Relationship to Patient: SELF   Pharmacy: [id number] - [place], [place] - [address] [facility] [phone] [facility] [phone]   Phone Number: [phone]   Reason for Call: PT BELIEVES SHE HAS BV AGAIN AND IS WANTING TO KNOW IF ANOTHER ROUND OF MEDICATION CAN BE CALLED IN OR IF ANOTHER MEDICATION CAN CALLED IN. PT HAS ODOR AND DISCHARGE. PLEASE CALL PT TO DISCUSS   When was the patient last seen: 3/30/22   When did it start: 6/9/21  Where is it located: [place]   Timing- Is it constant or intermittent: CONSTANT   What makes it worse: NA  What makes it better: NA   What therapies/medications have you tried: NA   
She should follow up in the office please. Thank you 
No (0)

## (undated) DEVICE — SUT VIC 3/0 SH 27IN J416H

## (undated) DEVICE — SOL IRR H2O BTL 1000ML STRL

## (undated) DEVICE — SUT GUT CHRM 0 CTX 27IN 864H

## (undated) DEVICE — 3M™ MEDIPORE™ H SOFT CLOTH SURGICAL TAPE 2864, 4 INCH X 10 YARD (10CM X 9,14M), 12 ROLLS/CASE: Brand: 3M™ MEDIPORE™

## (undated) DEVICE — SUT VIC 0 CT 36IN J958H

## (undated) DEVICE — STPLR SKIN VISISTAT WD 35CT

## (undated) DEVICE — SUT VIC 3/0 CTI 36IN J944H

## (undated) DEVICE — GLV SURG BIOGEL LTX PF 7 1/2